# Patient Record
Sex: FEMALE | Race: ASIAN | NOT HISPANIC OR LATINO
[De-identification: names, ages, dates, MRNs, and addresses within clinical notes are randomized per-mention and may not be internally consistent; named-entity substitution may affect disease eponyms.]

---

## 2019-05-22 VITALS
WEIGHT: 130 LBS | DIASTOLIC BLOOD PRESSURE: 69 MMHG | HEIGHT: 64 IN | SYSTOLIC BLOOD PRESSURE: 120 MMHG | BODY MASS INDEX: 22.2 KG/M2 | HEART RATE: 83 BPM

## 2019-06-17 ENCOUNTER — APPOINTMENT (OUTPATIENT)
Dept: ANTEPARTUM | Facility: CLINIC | Age: 29
End: 2019-06-17
Payer: COMMERCIAL

## 2019-06-17 ENCOUNTER — ASOB RESULT (OUTPATIENT)
Age: 29
End: 2019-06-17

## 2019-06-17 PROBLEM — Z00.00 ENCOUNTER FOR PREVENTIVE HEALTH EXAMINATION: Status: ACTIVE | Noted: 2019-06-17

## 2019-06-17 PROCEDURE — 76801 OB US < 14 WKS SINGLE FETUS: CPT

## 2019-06-17 PROCEDURE — 76813 OB US NUCHAL MEAS 1 GEST: CPT

## 2019-06-17 PROCEDURE — 36416 COLLJ CAPILLARY BLOOD SPEC: CPT

## 2019-06-19 VITALS — WEIGHT: 131 LBS | DIASTOLIC BLOOD PRESSURE: 76 MMHG | SYSTOLIC BLOOD PRESSURE: 115 MMHG

## 2019-07-01 ENCOUNTER — APPOINTMENT (OUTPATIENT)
Dept: ANTEPARTUM | Facility: CLINIC | Age: 29
End: 2019-07-01

## 2019-07-01 ENCOUNTER — ASOB RESULT (OUTPATIENT)
Age: 29
End: 2019-07-01

## 2019-07-07 ENCOUNTER — RECORD ABSTRACTING (OUTPATIENT)
Age: 29
End: 2019-07-07

## 2019-07-07 DIAGNOSIS — Z87.09 PERSONAL HISTORY OF OTHER DISEASES OF THE RESPIRATORY SYSTEM: ICD-10-CM

## 2019-07-07 DIAGNOSIS — Z83.42 FAMILY HISTORY OF FAMILIAL HYPERCHOLESTEROLEMIA: ICD-10-CM

## 2019-07-07 DIAGNOSIS — Z82.49 FAMILY HISTORY OF ISCHEMIC HEART DISEASE AND OTHER DISEASES OF THE CIRCULATORY SYSTEM: ICD-10-CM

## 2019-07-15 ENCOUNTER — APPOINTMENT (OUTPATIENT)
Dept: OBGYN | Facility: CLINIC | Age: 29
End: 2019-07-15
Payer: COMMERCIAL

## 2019-07-15 VITALS
HEIGHT: 64 IN | DIASTOLIC BLOOD PRESSURE: 71 MMHG | HEART RATE: 88 BPM | BODY MASS INDEX: 22.2 KG/M2 | TEMPERATURE: 97.7 F | OXYGEN SATURATION: 98 % | WEIGHT: 130 LBS | SYSTOLIC BLOOD PRESSURE: 108 MMHG

## 2019-07-15 PROCEDURE — 0502F SUBSEQUENT PRENATAL CARE: CPT

## 2019-07-23 ENCOUNTER — TRANSCRIPTION ENCOUNTER (OUTPATIENT)
Age: 29
End: 2019-07-23

## 2019-07-25 ENCOUNTER — NON-APPOINTMENT (OUTPATIENT)
Age: 29
End: 2019-07-25

## 2019-07-25 LAB
1ST TRIMESTER DATA: NORMAL
2ND TRIMESTER DATA: NORMAL
AFP PNL SERPL: NORMAL
AFP SERPL-ACNC: NORMAL
AFP SERPL-ACNC: NORMAL
B-HCG FREE SERPL-MCNC: NORMAL
CLINICAL BIOCHEMIST REVIEW: NORMAL
FREE BETA HCG 1ST TRIMESTER: NORMAL
INHIBIN A SERPL-MCNC: NORMAL
NOTES NTD: NORMAL
NT: NORMAL
PAPP-A SERPL-ACNC: NORMAL
U ESTRIOL SERPL-SCNC: NORMAL

## 2019-07-25 PROCEDURE — 0502F SUBSEQUENT PRENATAL CARE: CPT

## 2019-07-26 ENCOUNTER — NON-APPOINTMENT (OUTPATIENT)
Age: 29
End: 2019-07-26

## 2019-08-12 ENCOUNTER — ASOB RESULT (OUTPATIENT)
Age: 29
End: 2019-08-12

## 2019-08-12 ENCOUNTER — APPOINTMENT (OUTPATIENT)
Dept: ANTEPARTUM | Facility: CLINIC | Age: 29
End: 2019-08-12
Payer: COMMERCIAL

## 2019-08-12 ENCOUNTER — NON-APPOINTMENT (OUTPATIENT)
Age: 29
End: 2019-08-12

## 2019-08-12 ENCOUNTER — APPOINTMENT (OUTPATIENT)
Dept: OBGYN | Facility: CLINIC | Age: 29
End: 2019-08-12
Payer: COMMERCIAL

## 2019-08-12 VITALS
SYSTOLIC BLOOD PRESSURE: 111 MMHG | DIASTOLIC BLOOD PRESSURE: 68 MMHG | BODY MASS INDEX: 23.73 KG/M2 | HEART RATE: 88 BPM | HEIGHT: 64 IN | WEIGHT: 139 LBS

## 2019-08-12 PROCEDURE — 99213 OFFICE O/P EST LOW 20 MIN: CPT

## 2019-08-12 PROCEDURE — 76811 OB US DETAILED SNGL FETUS: CPT

## 2019-08-12 PROCEDURE — 76817 TRANSVAGINAL US OBSTETRIC: CPT

## 2019-09-12 ENCOUNTER — NON-APPOINTMENT (OUTPATIENT)
Age: 29
End: 2019-09-12

## 2019-09-12 ENCOUNTER — APPOINTMENT (OUTPATIENT)
Dept: OBGYN | Facility: CLINIC | Age: 29
End: 2019-09-12
Payer: COMMERCIAL

## 2019-09-12 VITALS
DIASTOLIC BLOOD PRESSURE: 72 MMHG | HEIGHT: 64 IN | SYSTOLIC BLOOD PRESSURE: 108 MMHG | BODY MASS INDEX: 25.27 KG/M2 | WEIGHT: 148 LBS

## 2019-09-12 PROCEDURE — 0502F SUBSEQUENT PRENATAL CARE: CPT

## 2019-09-13 LAB
APPEARANCE: CLEAR
BACTERIA: NEGATIVE
BILIRUBIN URINE: NEGATIVE
BLOOD URINE: NEGATIVE
COLOR: NORMAL
GLUCOSE QUALITATIVE U: NEGATIVE
HYALINE CASTS: 0 /LPF
KETONES URINE: NORMAL
LEUKOCYTE ESTERASE URINE: ABNORMAL
MICROSCOPIC-UA: NORMAL
NITRITE URINE: NEGATIVE
PH URINE: 6.5
PROTEIN URINE: NEGATIVE
RED BLOOD CELLS URINE: 2 /HPF
SPECIFIC GRAVITY URINE: 1.01
SQUAMOUS EPITHELIAL CELLS: 2 /HPF
UROBILINOGEN URINE: NORMAL
WHITE BLOOD CELLS URINE: 4 /HPF

## 2019-09-14 LAB — BACTERIA UR CULT: NORMAL

## 2019-09-25 ENCOUNTER — RESULT REVIEW (OUTPATIENT)
Age: 29
End: 2019-09-25

## 2019-09-25 LAB
BASOPHILS # BLD AUTO: 0.04 K/UL
BASOPHILS NFR BLD AUTO: 0.4 %
EOSINOPHIL # BLD AUTO: 0.07 K/UL
EOSINOPHIL NFR BLD AUTO: 0.8 %
GLUCOSE 1H P 50 G GLC PO SERPL-MCNC: 164 MG/DL
HCT VFR BLD CALC: 43.5 %
HGB BLD-MCNC: 13.4 G/DL
IMM GRANULOCYTES NFR BLD AUTO: 1.5 %
LYMPHOCYTES # BLD AUTO: 1.27 K/UL
LYMPHOCYTES NFR BLD AUTO: 13.6 %
MAN DIFF?: NORMAL
MCHC RBC-ENTMCNC: 30.2 PG
MCHC RBC-ENTMCNC: 30.8 GM/DL
MCV RBC AUTO: 98 FL
MONOCYTES # BLD AUTO: 0.48 K/UL
MONOCYTES NFR BLD AUTO: 5.1 %
NEUTROPHILS # BLD AUTO: 7.33 K/UL
NEUTROPHILS NFR BLD AUTO: 78.6 %
PLATELET # BLD AUTO: 289 K/UL
RBC # BLD: 4.44 M/UL
RBC # FLD: 13.8 %
WBC # FLD AUTO: 9.33 K/UL

## 2019-10-09 ENCOUNTER — APPOINTMENT (OUTPATIENT)
Dept: OBGYN | Facility: CLINIC | Age: 29
End: 2019-10-09

## 2019-10-09 VITALS
HEIGHT: 64 IN | BODY MASS INDEX: 25.61 KG/M2 | DIASTOLIC BLOOD PRESSURE: 65 MMHG | SYSTOLIC BLOOD PRESSURE: 100 MMHG | WEIGHT: 150 LBS

## 2019-10-10 LAB
GLUCOSE 1H P 100 G GLC PO SERPL-MCNC: 104 MG/DL
GLUCOSE 2H P CHAL SERPL-MCNC: 103 MG/DL
GLUCOSE 3H P CHAL SERPL-MCNC: 96 MG/DL
GLUCOSE BS SERPL-MCNC: 72 MG/DL

## 2019-10-23 ENCOUNTER — APPOINTMENT (OUTPATIENT)
Dept: OBGYN | Facility: CLINIC | Age: 29
End: 2019-10-23
Payer: COMMERCIAL

## 2019-10-23 ENCOUNTER — NON-APPOINTMENT (OUTPATIENT)
Age: 29
End: 2019-10-23

## 2019-10-23 VITALS
BODY MASS INDEX: 26.29 KG/M2 | SYSTOLIC BLOOD PRESSURE: 103 MMHG | DIASTOLIC BLOOD PRESSURE: 65 MMHG | WEIGHT: 154 LBS | HEIGHT: 64 IN

## 2019-10-23 PROCEDURE — 0502F SUBSEQUENT PRENATAL CARE: CPT

## 2019-11-05 ENCOUNTER — ASOB RESULT (OUTPATIENT)
Age: 29
End: 2019-11-05

## 2019-11-05 ENCOUNTER — APPOINTMENT (OUTPATIENT)
Dept: ANTEPARTUM | Facility: CLINIC | Age: 29
End: 2019-11-05
Payer: COMMERCIAL

## 2019-11-05 PROCEDURE — 76816 OB US FOLLOW-UP PER FETUS: CPT

## 2019-11-06 ENCOUNTER — APPOINTMENT (OUTPATIENT)
Dept: OBGYN | Facility: CLINIC | Age: 29
End: 2019-11-06
Payer: COMMERCIAL

## 2019-11-06 ENCOUNTER — NON-APPOINTMENT (OUTPATIENT)
Age: 29
End: 2019-11-06

## 2019-11-06 VITALS
SYSTOLIC BLOOD PRESSURE: 107 MMHG | HEIGHT: 64 IN | DIASTOLIC BLOOD PRESSURE: 70 MMHG | BODY MASS INDEX: 26.29 KG/M2 | WEIGHT: 154 LBS

## 2019-11-06 PROCEDURE — 0502F SUBSEQUENT PRENATAL CARE: CPT

## 2019-11-20 ENCOUNTER — APPOINTMENT (OUTPATIENT)
Dept: OBGYN | Facility: CLINIC | Age: 29
End: 2019-11-20
Payer: COMMERCIAL

## 2019-11-20 ENCOUNTER — NON-APPOINTMENT (OUTPATIENT)
Age: 29
End: 2019-11-20

## 2019-11-20 VITALS
BODY MASS INDEX: 26.63 KG/M2 | SYSTOLIC BLOOD PRESSURE: 111 MMHG | WEIGHT: 156 LBS | DIASTOLIC BLOOD PRESSURE: 69 MMHG | HEIGHT: 64 IN

## 2019-11-20 PROCEDURE — 0502F SUBSEQUENT PRENATAL CARE: CPT

## 2019-12-04 ENCOUNTER — NON-APPOINTMENT (OUTPATIENT)
Age: 29
End: 2019-12-04

## 2019-12-04 ENCOUNTER — APPOINTMENT (OUTPATIENT)
Dept: OBGYN | Facility: CLINIC | Age: 29
End: 2019-12-04

## 2019-12-04 VITALS
BODY MASS INDEX: 27.31 KG/M2 | HEIGHT: 64 IN | SYSTOLIC BLOOD PRESSURE: 114 MMHG | DIASTOLIC BLOOD PRESSURE: 81 MMHG | WEIGHT: 160 LBS

## 2019-12-05 LAB
C TRACH RRNA SPEC QL NAA+PROBE: NOT DETECTED
N GONORRHOEA RRNA SPEC QL NAA+PROBE: NOT DETECTED
SOURCE AMPLIFICATION: NORMAL

## 2019-12-06 LAB
GP B STREP DNA SPEC QL NAA+PROBE: NORMAL
GP B STREP DNA SPEC QL NAA+PROBE: NOT DETECTED
SOURCE GBS: NORMAL

## 2019-12-16 ENCOUNTER — APPOINTMENT (OUTPATIENT)
Dept: OBGYN | Facility: CLINIC | Age: 29
End: 2019-12-16
Payer: COMMERCIAL

## 2019-12-16 ENCOUNTER — NON-APPOINTMENT (OUTPATIENT)
Age: 29
End: 2019-12-16

## 2019-12-16 VITALS
BODY MASS INDEX: 27.49 KG/M2 | DIASTOLIC BLOOD PRESSURE: 72 MMHG | WEIGHT: 161 LBS | HEIGHT: 64 IN | SYSTOLIC BLOOD PRESSURE: 113 MMHG

## 2019-12-16 PROCEDURE — 0502F SUBSEQUENT PRENATAL CARE: CPT

## 2019-12-26 ENCOUNTER — APPOINTMENT (OUTPATIENT)
Dept: OBGYN | Facility: CLINIC | Age: 29
End: 2019-12-26
Payer: COMMERCIAL

## 2019-12-26 ENCOUNTER — NON-APPOINTMENT (OUTPATIENT)
Age: 29
End: 2019-12-26

## 2019-12-26 VITALS
HEIGHT: 64 IN | SYSTOLIC BLOOD PRESSURE: 112 MMHG | WEIGHT: 162 LBS | DIASTOLIC BLOOD PRESSURE: 73 MMHG | BODY MASS INDEX: 27.66 KG/M2

## 2019-12-26 DIAGNOSIS — Z87.898 PERSONAL HISTORY OF OTHER SPECIFIED CONDITIONS: ICD-10-CM

## 2019-12-26 DIAGNOSIS — Z34.00 ENCOUNTER FOR SUPERVISION OF NORMAL FIRST PREGNANCY, UNSPECIFIED TRIMESTER: ICD-10-CM

## 2019-12-26 DIAGNOSIS — Z32.01 ENCOUNTER FOR PREGNANCY TEST, RESULT POSITIVE: ICD-10-CM

## 2019-12-26 PROCEDURE — 0502F SUBSEQUENT PRENATAL CARE: CPT

## 2019-12-27 ENCOUNTER — INPATIENT (INPATIENT)
Facility: HOSPITAL | Age: 29
LOS: 2 days | Discharge: HOME | End: 2019-12-30
Attending: OBSTETRICS & GYNECOLOGY | Admitting: OBSTETRICS & GYNECOLOGY
Payer: COMMERCIAL

## 2019-12-27 ENCOUNTER — TRANSCRIPTION ENCOUNTER (OUTPATIENT)
Age: 29
End: 2019-12-27

## 2019-12-27 VITALS
SYSTOLIC BLOOD PRESSURE: 136 MMHG | TEMPERATURE: 99 F | DIASTOLIC BLOOD PRESSURE: 78 MMHG | HEART RATE: 94 BPM | RESPIRATION RATE: 18 BRPM

## 2019-12-27 DIAGNOSIS — O26.899 OTHER SPECIFIED PREGNANCY RELATED CONDITIONS, UNSPECIFIED TRIMESTER: ICD-10-CM

## 2019-12-27 DIAGNOSIS — Z3A.00 WEEKS OF GESTATION OF PREGNANCY NOT SPECIFIED: ICD-10-CM

## 2019-12-27 LAB
BASOPHILS # BLD AUTO: 0.06 K/UL — SIGNIFICANT CHANGE UP (ref 0–0.2)
BASOPHILS NFR BLD AUTO: 0.4 % — SIGNIFICANT CHANGE UP (ref 0–2)
BLD GP AB SCN SERPL QL: NEGATIVE — SIGNIFICANT CHANGE UP
EOSINOPHIL # BLD AUTO: 0.06 K/UL — SIGNIFICANT CHANGE UP (ref 0–0.5)
EOSINOPHIL NFR BLD AUTO: 0.4 % — SIGNIFICANT CHANGE UP (ref 0–6)
HCT VFR BLD CALC: 42.3 % — SIGNIFICANT CHANGE UP (ref 34.5–45)
HGB BLD-MCNC: 14.1 G/DL — SIGNIFICANT CHANGE UP (ref 11.5–15.5)
IMM GRANULOCYTES NFR BLD AUTO: 1 % — SIGNIFICANT CHANGE UP (ref 0–1.5)
LYMPHOCYTES # BLD AUTO: 13.5 % — SIGNIFICANT CHANGE UP (ref 13–44)
LYMPHOCYTES # BLD AUTO: 2.1 K/UL — SIGNIFICANT CHANGE UP (ref 1–3.3)
MCHC RBC-ENTMCNC: 29.4 PG — SIGNIFICANT CHANGE UP (ref 27–34)
MCHC RBC-ENTMCNC: 33.3 % — SIGNIFICANT CHANGE UP (ref 32–36)
MCV RBC AUTO: 88.3 FL — SIGNIFICANT CHANGE UP (ref 80–100)
MONOCYTES # BLD AUTO: 1.17 K/UL — HIGH (ref 0–0.9)
MONOCYTES NFR BLD AUTO: 7.5 % — SIGNIFICANT CHANGE UP (ref 2–14)
NEUTROPHILS # BLD AUTO: 12.01 K/UL — HIGH (ref 1.8–7.4)
NEUTROPHILS NFR BLD AUTO: 77.2 % — HIGH (ref 43–77)
NRBC # FLD: 0 K/UL — SIGNIFICANT CHANGE UP (ref 0–0)
PLATELET # BLD AUTO: 303 K/UL — SIGNIFICANT CHANGE UP (ref 150–400)
PMV BLD: 11.4 FL — SIGNIFICANT CHANGE UP (ref 7–13)
RBC # BLD: 4.79 M/UL — SIGNIFICANT CHANGE UP (ref 3.8–5.2)
RBC # FLD: 13.2 % — SIGNIFICANT CHANGE UP (ref 10.3–14.5)
RH IG SCN BLD-IMP: POSITIVE — SIGNIFICANT CHANGE UP
RH IG SCN BLD-IMP: POSITIVE — SIGNIFICANT CHANGE UP
WBC # BLD: 15.56 K/UL — HIGH (ref 3.8–10.5)
WBC # FLD AUTO: 15.56 K/UL — HIGH (ref 3.8–10.5)

## 2019-12-27 PROCEDURE — 99223 1ST HOSP IP/OBS HIGH 75: CPT

## 2019-12-27 RX ORDER — OXYTOCIN 10 UNIT/ML
2 VIAL (ML) INJECTION
Qty: 30 | Refills: 0 | Status: DISCONTINUED | OUTPATIENT
Start: 2019-12-27 | End: 2019-12-28

## 2019-12-27 RX ORDER — OXYTOCIN 10 UNIT/ML
333.33 VIAL (ML) INJECTION
Qty: 20 | Refills: 0 | Status: DISCONTINUED | OUTPATIENT
Start: 2019-12-27 | End: 2019-12-28

## 2019-12-27 RX ORDER — SODIUM CHLORIDE 9 MG/ML
1000 INJECTION, SOLUTION INTRAVENOUS
Refills: 0 | Status: DISCONTINUED | OUTPATIENT
Start: 2019-12-27 | End: 2019-12-28

## 2019-12-27 RX ADMIN — Medication 2 MILLIUNIT(S)/MIN: at 23:02

## 2019-12-27 RX ADMIN — SODIUM CHLORIDE 125 MILLILITER(S): 9 INJECTION, SOLUTION INTRAVENOUS at 22:00

## 2019-12-27 NOTE — OB PROVIDER H&P - ATTENDING COMMENTS
29yrs old  ega [id number] came in labor  vitals stable,afebrile,bp130/72,pulse 82/minute  per abdomen soft, bs present ,uterus 39weeks marciano every 2-3 minutes ,fetal heart rate 140s with category 1 tracing  pelvic exam deferred  assessment 39weeks 3days in active labor  plan expect normal vaginal delivery

## 2019-12-27 NOTE — CHART NOTE - NSCHARTNOTEFT_GEN_A_CORE
R1 OB Progress Note    Patient seen and evaluated at bedside.       T(C): 36.6 (12-27-19 @ 22:29), Max: 37 (12-27-19 @ 20:49)  HR: 82 (12-27-19 @ 23:24) (75 - 99)  BP: 127/69 (12-27-19 @ 22:41) (112/58 - 147/79)  RR: 18 (12-27-19 @ 22:29) (18 - 18)  SpO2: 100% (12-27-19 @ 23:24) (99% - 100%)    SVE: 10/100/0  EFM: 125, mod elen, + accels, 2 late decelerations CATII  [place]:  q3m    A/P 29y P0   admitted for labor  -Labor: Was on pitocin but was discontinued at this time  -Fetus: category 2 tracing due to late deceleration.  Overall reassuring w/ moderate variability and accels.   Continue lateral tilt, O2 as needed for intrauterine resucitation.      Alberto Dobson PGY1  Dr. Mercado at bedside

## 2019-12-27 NOTE — CHART NOTE - NSCHARTNOTEFT_GEN_A_CORE
Pt examined at bedside, urge to push.    SVE: 10/100/0, intact bag    Plan:   - pt requesting epidural, anesthesia at bedside    MARILEE Mata PGY3

## 2019-12-27 NOTE — OB PROVIDER H&P - NSHPPHYSICALEXAM_GEN_ALL_CORE
Bp:136/78 Hr:94 T:98.6  Abdomen: Soft, non-tender on palpation  SVE:8/100/-2  NST: Reactive   [place]: Ctx Q2mins  GBS Negative 12/4/2019  EFW: 3200

## 2019-12-27 NOTE — OB PROVIDER IHI INDUCTION/AUGMENTATION NOTE - NS_CHECKALL_OBGYN_ALL_OB
Order was written/Induction / Augmentation was discussed/H&P was completed/FHR was reviewed/Contractions pattern was reviewed

## 2019-12-27 NOTE — CHART NOTE - NSCHARTNOTEFT_GEN_A_CORE
pelvic exam cervix is fully dilated ,cephalic presentation,0 station arom done. clear amniotic fluid  assessement 39weeks in active labor  plan for expectant management

## 2019-12-27 NOTE — DISCHARGE NOTE OB - PATIENT PORTAL LINK FT
You can access the FollowMyHealth Patient Portal offered by [facility] by registering at the following website: [email or web address]. By joining [facility]’s FollowMyHealth portal, you will also be able to view your health information using other applications (apps) compatible with our system.

## 2019-12-27 NOTE — DISCHARGE NOTE OB - CARE PLAN
Principal Discharge DX:	Vaginal delivery  Goal:	doing good  Assessment and plan of treatment:	f/u [id number] in 6weeks,regular diet ,activity as tolerated

## 2019-12-27 NOTE — DISCHARGE NOTE OB - MEDICATION SUMMARY - MEDICATIONS TO TAKE
I will START or STAY ON the medications listed below when I get home from the hospital:    Actamin 325 mg oral tablet  -- 3 tab(s) by mouth   -- Indication: For Vaginal delivery    Alivio 600 mg oral tablet  -- 1 tab(s) by mouth every 6 hours  -- Indication: For Vaginal delivery    ProAir HFA 90 mcg/inh inhalation aerosol  -- 2 puff(s) inhaled every 6 hours, As needed, Shortness of Breath and/or Wheezing  -- Indication: For Vaginal delivery

## 2019-12-27 NOTE — DISCHARGE NOTE OB - CARE PROVIDER_API CALL
Cecile Mercado)  Obstetrics and Gynecology  [address], [address] [place] [address] [place] [address]  Phone: [phone]  Fax: [phone]  Follow Up Time:

## 2019-12-27 NOTE — OB PROVIDER TRIAGE NOTE - NSOBPROVIDERNOTE_OBGYN_ALL_OB_FT
Evidence of labor. Discussed with Dr. Santos:  -Full admit to labor and delivery  -Routine orders placed  -Epidural for pain management   -Expectant Management

## 2019-12-27 NOTE — OB PROVIDER H&P - ASSESSMENT
Pt of Dr. Mercado,  female  @39.3 weeks gestation presents to triage with c/o regular, painful ctx. Pt complains of vaginal bleeding. Pt reports positive fetal movement.   Current Ap course uncomplicated  Medical H/x: Asthma, Last attack 1 year ago  Surgical H/x: Denies  Ob/Gyn H/x: Ovarian cyst   Psych H/X: Denies  Meds: Pnv  NKDA      Bp:136/78 Hr:94 T:98.6  Abdomen: Soft, non-tender on palpation  SVE:8/100/-2, Bloody show   NST: Reactive   [place]: Ctx Q2mins  GBS Negative 2019  EFW: 3200    Evidence of labor. Discussed with Dr. Santos:  -Full admit to labor and delivery  -Routine orders placed  -Epidural for pain management   -Expectant Management

## 2019-12-27 NOTE — OB PROVIDER TRIAGE NOTE - NSHPPHYSICALEXAM_GEN_ALL_CORE
Bp:136/78 Hr:94 T:98.6  Abdomen: Soft, non-tender on palpation  SVE:8/100/-2  NST: Reactive   [place]: Ctx Q2mins

## 2019-12-27 NOTE — OB PROVIDER TRIAGE NOTE - HISTORY OF PRESENT ILLNESS
Pt of Dr. Mercado  female  @39.3 weeks gestation presents to triage with c/o regular, painful ctx. Pt complains of vaginal bleeding. Pt reports positive fetal movement.   Current Ap course uncomplicated  Medical H/x: Asthma, Last attack 1 year ago  Surgical H/x: Denies  Ob/Gyn H/x: Ovarian cyst   Psych H/X: Denies  Meds: Pnv  NKDA

## 2019-12-27 NOTE — OB PROVIDER DELIVERY SUMMARY - NSPROVIDERDELIVERYNOTE_OBGYN_ALL_OB_FT
29yrs old  ega [id number] has normal vaginal delivery a live child with apgar scores9&9 with 2nd degree perineal laceration.placenta came intact with membranes.wound sutured in layers.complications none.estimated blood loss 29yrs old  ega [id number] has normal vaginal delivery a live child with apgar scores9&9 with RLM cut to facilitate delivery. placenta came intact with membranes.wound sutured in layers.complications none.estimated blood loss 300ml.

## 2019-12-28 PROCEDURE — 59409 OBSTETRICAL CARE: CPT | Mod: U9

## 2019-12-28 RX ORDER — OXYCODONE HYDROCHLORIDE 5 MG/1
5 TABLET ORAL
Refills: 0 | Status: DISCONTINUED | OUTPATIENT
Start: 2019-12-28 | End: 2019-12-30

## 2019-12-28 RX ORDER — TETANUS TOXOID, REDUCED DIPHTHERIA TOXOID AND ACELLULAR PERTUSSIS VACCINE, ADSORBED 5; 2.5; 8; 8; 2.5 [IU]/.5ML; [IU]/.5ML; UG/.5ML; UG/.5ML; UG/.5ML
0.5 SUSPENSION INTRAMUSCULAR ONCE
Refills: 0 | Status: DISCONTINUED | OUTPATIENT
Start: 2019-12-28 | End: 2019-12-30

## 2019-12-28 RX ORDER — SODIUM CHLORIDE 9 MG/ML
3 INJECTION INTRAMUSCULAR; INTRAVENOUS; SUBCUTANEOUS EVERY 8 HOURS
Refills: 0 | Status: DISCONTINUED | OUTPATIENT
Start: 2019-12-28 | End: 2019-12-30

## 2019-12-28 RX ORDER — IBUPROFEN 200 MG
600 TABLET ORAL EVERY 6 HOURS
Refills: 0 | Status: DISCONTINUED | OUTPATIENT
Start: 2019-12-28 | End: 2019-12-30

## 2019-12-28 RX ORDER — AER TRAVELER 0.5 G/1
1 SOLUTION RECTAL; TOPICAL EVERY 4 HOURS
Refills: 0 | Status: DISCONTINUED | OUTPATIENT
Start: 2019-12-28 | End: 2019-12-30

## 2019-12-28 RX ORDER — HYDROCORTISONE 1 %
1 OINTMENT (GRAM) TOPICAL EVERY 6 HOURS
Refills: 0 | Status: DISCONTINUED | OUTPATIENT
Start: 2019-12-28 | End: 2019-12-30

## 2019-12-28 RX ORDER — ALBUTEROL 90 UG/1
2 AEROSOL, METERED ORAL
Qty: 0 | Refills: 0 | DISCHARGE
Start: 2019-12-28

## 2019-12-28 RX ORDER — BENZOCAINE 10 %
1 GEL (GRAM) MUCOUS MEMBRANE EVERY 6 HOURS
Refills: 0 | Status: DISCONTINUED | OUTPATIENT
Start: 2019-12-28 | End: 2019-12-30

## 2019-12-28 RX ORDER — DIPHENHYDRAMINE HCL 50 MG
25 CAPSULE ORAL EVERY 6 HOURS
Refills: 0 | Status: DISCONTINUED | OUTPATIENT
Start: 2019-12-28 | End: 2019-12-30

## 2019-12-28 RX ORDER — KETOROLAC TROMETHAMINE 30 MG/ML
30 SYRINGE (ML) INJECTION ONCE
Refills: 0 | Status: DISCONTINUED | OUTPATIENT
Start: 2019-12-28 | End: 2019-12-28

## 2019-12-28 RX ORDER — ALBUTEROL 90 UG/1
2 AEROSOL, METERED ORAL EVERY 6 HOURS
Refills: 0 | Status: DISCONTINUED | OUTPATIENT
Start: 2019-12-28 | End: 2019-12-30

## 2019-12-28 RX ORDER — OXYTOCIN 10 UNIT/ML
333.33 VIAL (ML) INJECTION
Qty: 20 | Refills: 0 | Status: DISCONTINUED | OUTPATIENT
Start: 2019-12-28 | End: 2019-12-28

## 2019-12-28 RX ORDER — SIMETHICONE 80 MG/1
80 TABLET, CHEWABLE ORAL EVERY 4 HOURS
Refills: 0 | Status: DISCONTINUED | OUTPATIENT
Start: 2019-12-28 | End: 2019-12-30

## 2019-12-28 RX ORDER — PRAMOXINE HYDROCHLORIDE 150 MG/15G
1 AEROSOL, FOAM RECTAL EVERY 4 HOURS
Refills: 0 | Status: DISCONTINUED | OUTPATIENT
Start: 2019-12-28 | End: 2019-12-30

## 2019-12-28 RX ORDER — IBUPROFEN 200 MG
1 TABLET ORAL
Qty: 0 | Refills: 0 | DISCHARGE
Start: 2019-12-28

## 2019-12-28 RX ORDER — ACETAMINOPHEN 500 MG
3 TABLET ORAL
Qty: 0 | Refills: 0 | DISCHARGE
Start: 2019-12-28

## 2019-12-28 RX ORDER — ACETAMINOPHEN 500 MG
975 TABLET ORAL
Refills: 0 | Status: DISCONTINUED | OUTPATIENT
Start: 2019-12-28 | End: 2019-12-30

## 2019-12-28 RX ORDER — IBUPROFEN 200 MG
600 TABLET ORAL EVERY 6 HOURS
Refills: 0 | Status: COMPLETED | OUTPATIENT
Start: 2019-12-28 | End: 2020-11-25

## 2019-12-28 RX ORDER — OXYCODONE HYDROCHLORIDE 5 MG/1
5 TABLET ORAL ONCE
Refills: 0 | Status: DISCONTINUED | OUTPATIENT
Start: 2019-12-28 | End: 2019-12-30

## 2019-12-28 RX ORDER — GLYCERIN ADULT
1 SUPPOSITORY, RECTAL RECTAL AT BEDTIME
Refills: 0 | Status: DISCONTINUED | OUTPATIENT
Start: 2019-12-28 | End: 2019-12-30

## 2019-12-28 RX ORDER — DIBUCAINE 1 %
1 OINTMENT (GRAM) RECTAL EVERY 6 HOURS
Refills: 0 | Status: DISCONTINUED | OUTPATIENT
Start: 2019-12-28 | End: 2019-12-30

## 2019-12-28 RX ORDER — LANOLIN
1 OINTMENT (GRAM) TOPICAL EVERY 6 HOURS
Refills: 0 | Status: DISCONTINUED | OUTPATIENT
Start: 2019-12-28 | End: 2019-12-30

## 2019-12-28 RX ORDER — MAGNESIUM HYDROXIDE 400 MG/1
30 TABLET, CHEWABLE ORAL
Refills: 0 | Status: DISCONTINUED | OUTPATIENT
Start: 2019-12-28 | End: 2019-12-30

## 2019-12-28 RX ADMIN — Medication 975 MILLIGRAM(S): at 21:50

## 2019-12-28 RX ADMIN — Medication 600 MILLIGRAM(S): at 09:43

## 2019-12-28 RX ADMIN — Medication 1 TABLET(S): at 08:43

## 2019-12-28 RX ADMIN — Medication 30 MILLIGRAM(S): at 02:17

## 2019-12-28 RX ADMIN — Medication 600 MILLIGRAM(S): at 17:46

## 2019-12-28 RX ADMIN — SODIUM CHLORIDE 3 MILLILITER(S): 9 INJECTION INTRAMUSCULAR; INTRAVENOUS; SUBCUTANEOUS at 14:40

## 2019-12-28 RX ADMIN — Medication 975 MILLIGRAM(S): at 21:17

## 2019-12-28 RX ADMIN — Medication 30 MILLIGRAM(S): at 01:59

## 2019-12-28 RX ADMIN — Medication 600 MILLIGRAM(S): at 08:43

## 2019-12-28 RX ADMIN — Medication 1000 MILLIUNIT(S)/MIN: at 01:43

## 2019-12-28 RX ADMIN — Medication 600 MILLIGRAM(S): at 16:46

## 2019-12-28 RX ADMIN — Medication 600 MILLIGRAM(S): at 23:27

## 2019-12-28 NOTE — PROGRESS NOTE ADULT - SUBJECTIVE AND OBJECTIVE BOX
subjective:  no complaints    objective:    Vital Signs Last 24 Hrs  T(C): 37 (28 Dec 2019 09:58), Max: 37 (27 Dec 2019 20:49)  T(F): 98.6 (28 Dec 2019 09:58), Max: 98.6 (27 Dec 2019 20:49)  HR: 74 (28 Dec 2019 07:02) (68 - 106)  BP: 110/54 (28 Dec 2019 07:02) (100/57 - 147/79)  BP(mean): --  RR: 16 (28 Dec 2019 09:58) (16 - 18)  SpO2: 98% (28 Dec 2019 09:58) (87% - 100%)    REVIEW OF SYSTEMS:  ALL SYSTEMS WNL    Allergies    No Known Allergies    Intolerances        PHYSICAL EXAM:  Breasts:soft,no masses felt    Respiratory:wnl    Cardiovascular:s1&S2 HEARD,NO MURMURS    Gastrointestinal:BOWEL SOUNDS PRESENT,ABDOMEN SOFT    Genitourinary:UTERUS FIRM,PERINEUM WOUND CLEAN&DRY,MINIMAL VAGINAL BLEEDING    Extremities:NO EDEMA PRESENT    MEDICATIONS  (STANDING):  acetaminophen   Tablet .. 975 milliGRAM(s) Oral <User Schedule>  diphtheria/tetanus/pertussis (acellular) Vaccine (ADAcel) 0.5 milliLiter(s) IntraMuscular once  ibuprofen  Tablet. 600 milliGRAM(s) Oral every 6 hours  prenatal multivitamin 1 Tablet(s) Oral daily  sodium chloride 0.9% lock flush 3 milliLiter(s) IV Push every 8 hours    MEDICATIONS  (PRN):  ALBUTerol    90 MICROgram(s) HFA Inhaler 2 Puff(s) Inhalation every 6 hours PRN Shortness of Breath and/or Wheezing  benzocaine 20%/menthol 0.5% Spray 1 Spray(s) Topical every 6 hours PRN for Perineal discomfort  dibucaine 1% Ointment 1 Application(s) Topical every 6 hours PRN Perineal discomfort  diphenhydrAMINE 25 milliGRAM(s) Oral every 6 hours PRN Pruritus  glycerin Suppository - Adult 1 Suppository(s) Rectal at bedtime PRN Constipation  hydrocortisone 1% Cream 1 Application(s) Topical every 6 hours PRN Moderate Pain (4-6)  lanolin Ointment 1 Application(s) Topical every 6 hours PRN nipple soreness  magnesium hydroxide Suspension 30 milliLiter(s) Oral two times a day PRN Constipation  oxyCODONE    IR 5 milliGRAM(s) Oral every 3 hours PRN Moderate to Severe Pain (4-10)  oxyCODONE    IR 5 milliGRAM(s) Oral once PRN Moderate to Severe Pain (4-10)  pramoxine 1%/zinc 5% Cream 1 Application(s) Topical every 4 hours PRN Moderate Pain (4-6)  simethicone 80 milliGRAM(s) Chew every 4 hours PRN Gas  witch hazel Pads 1 Application(s) Topical every 4 hours PRN Perineal discomfort      LABS:                        14.1   15.56 )-----------( 303      ( 27 Dec 2019 21:50 )             42.3                 RADIOLOGY & ADDITIONAL TESTS:  assessement s/p normal vaginal delivery day#0  plan for d/c home  on 12/30/2019

## 2019-12-28 NOTE — OB RN DELIVERY SUMMARY - NS_SEPSISRSKCALC_OBGYN_ALL_OB_FT
----- Message from Juliet Nix sent at 3/17/2017  4:28 PM EDT -----  Contact: PATIENT  PATIENT IS CALLING STATING DR. MACK SENT PATIENT A LETTER STATING SHE WOULD NEED TO HAVE HER BLOOD REDRAWN. LOOKED IN CHART THERE ARE NO ORDERS FOR BLOOD WORK. PATIENT WANTS TO KNOW DOES SHE NEED TO HAVE THEM REDRAWN AND IF SO CAN SHE GET ORDERS FOR BLOOD WORK. YOU CAN REACH PATIENT BACK [phone]  
I lmom to advise the patient the labs need to be drawn about 8 weeks from now and advised her to call back about one week before so lab order can be created  Advised pt to call back if any questions  
EOS calculated successfully. EOS Risk Factor: 0.5/1000 live births ([facility] national incidence); GA=39w4d; Temp=98.6; ROM=2.417; GBS='Negative'; Antibiotics='No antibiotics or any antibiotics < 2 hrs prior to birth'

## 2019-12-28 NOTE — OB NEONATOLOGY/PEDIATRICIAN DELIVERY SUMMARY - NSPEDSNEONOTESA_OBGYN_ALL_OB_FT
Baby is a 39 and 3 wk female born to a 28 y/o  mother via precip . Maternal history of ovarian cyst.Prenatal history uncomplicated. Maternal blood type O+. PNL negative, non-reactive, and immune. GBS negative on 19. AROM at 22:43 on  clear fluids. Baby born vigorous and crying spontaneously. Warmed, dried, stimulated. Apgars 9/9. EOS 0.06. Mom plans to breastfeed and consents hepB.   :19  TOB:1:08 am  BW:2735  ADOD:19

## 2019-12-29 PROCEDURE — 99232 SBSQ HOSP IP/OBS MODERATE 35: CPT

## 2019-12-29 RX ADMIN — Medication 600 MILLIGRAM(S): at 16:18

## 2019-12-29 RX ADMIN — Medication 975 MILLIGRAM(S): at 03:23

## 2019-12-29 RX ADMIN — Medication 1 TABLET(S): at 12:27

## 2019-12-29 RX ADMIN — Medication 600 MILLIGRAM(S): at 10:27

## 2019-12-29 RX ADMIN — Medication 600 MILLIGRAM(S): at 14:59

## 2019-12-29 RX ADMIN — Medication 975 MILLIGRAM(S): at 17:47

## 2019-12-29 RX ADMIN — Medication 975 MILLIGRAM(S): at 17:17

## 2019-12-29 RX ADMIN — Medication 975 MILLIGRAM(S): at 02:59

## 2019-12-29 RX ADMIN — Medication 1 SPRAY(S): at 23:34

## 2019-12-29 RX ADMIN — Medication 975 MILLIGRAM(S): at 12:15

## 2019-12-29 RX ADMIN — Medication 600 MILLIGRAM(S): at 08:38

## 2019-12-29 RX ADMIN — Medication 1 APPLICATION(S): at 23:34

## 2019-12-29 RX ADMIN — PRAMOXINE HYDROCHLORIDE 1 APPLICATION(S): 150 AEROSOL, FOAM RECTAL at 23:35

## 2019-12-29 RX ADMIN — Medication 600 MILLIGRAM(S): at 23:35

## 2019-12-29 RX ADMIN — Medication 975 MILLIGRAM(S): at 13:39

## 2019-12-29 RX ADMIN — Medication 600 MILLIGRAM(S): at 00:01

## 2019-12-29 NOTE — LACTATION INITIAL EVALUATION - INTERVENTION OUTCOME
verbalizes understanding/demonstrates understanding of teaching/Continued assessment and assistance needed at this time./good return demonstration

## 2019-12-29 NOTE — LACTATION INITIAL EVALUATION - LACTATION INTERVENTIONS
initiate dual electric pump routine/initiate skin to skin/initiate hand expression routine/Instructed and assisted with positioning to facilitate proper latch.  Infant observed sustaining latch, suck and swallow.  Infant initiated phototherapy after breastfeeding.  Breastfeeding strategies discussed with patient with regards to increasing milk production.  Feeding cues and feeding log discussed.  Hand expression taught.  Outpatient resources discussed.  Encouraged to call for assistance, as needed.  Primary RN made aware of consult and plan./stimulate nutritive suck using

## 2019-12-29 NOTE — PROGRESS NOTE ADULT - SUBJECTIVE AND OBJECTIVE BOX
Anesthesia Post-op Note    POD#1 S/P labor epidural     Patient is doing well.  OOBAA. Tolerating clears.  Pain is tolerable.  No residual anesthetic issues or complications noted.

## 2019-12-29 NOTE — PROGRESS NOTE ADULT - PROBLEM SELECTOR PLAN 1
- Pain well controlled, continue current pain regimen  - Increase ambulation, SCDs when not ambulating  - Continue regular diet    Carlota Bella PGY-1

## 2019-12-29 NOTE — PROGRESS NOTE ADULT - SUBJECTIVE AND OBJECTIVE BOX
OB Progress Note: VAVD PPD#1    S: 30yo now PPD#1 s/p VAVD. Patient feels well. Pain is well controlled. She is tolerating a regular diet and passing flatus. She is voiding spontaneously, and ambulating without difficulty. Denies CP/SOB. Denies lightheadedness/dizziness. Denies N/V.    O:  Vitals:  Vital Signs Last 24 Hrs  T(C): 36.7 (28 Dec 2019 17:58), Max: 37 (28 Dec 2019 09:58)  T(F): 98.1 (28 Dec 2019 17:58), Max: 98.6 (28 Dec 2019 09:58)  HR: 73 (28 Dec 2019 17:58) (73 - 74)  BP: 114/69 (28 Dec 2019 17:58) (110/54 - 114/69)  BP(mean): --  RR: 16 (28 Dec 2019 17:58) (16 - 17)  SpO2: 95% (28 Dec 2019 17:58) (95% - 99%)    MEDICATIONS  (STANDING):  acetaminophen   Tablet .. 975 milliGRAM(s) Oral <User Schedule>  diphtheria/tetanus/pertussis (acellular) Vaccine (ADAcel) 0.5 milliLiter(s) IntraMuscular once  ibuprofen  Tablet. 600 milliGRAM(s) Oral every 6 hours  measles/mumps/rubella Vaccine 0.5 milliLiter(s) SubCutaneous once  prenatal multivitamin 1 Tablet(s) Oral daily  sodium chloride 0.9% lock flush 3 milliLiter(s) IV Push every 8 hours      Labs:  Blood type: O Positive  Rubella IgG: RPR:                           14.1   15.56<H> >-----------< 303    ( 12-27 @ 21:50 )             42.3      Physical Exam:  General: NAD  Abdomen: soft, non-tender, non-distended, fundus firm  Vaginal: Lochia wnl  Extremities: No erythema/edema

## 2019-12-29 NOTE — PROGRESS NOTE ADULT - SUBJECTIVE AND OBJECTIVE BOX
subjective:  no complaints    objective:    Vital Signs Last 24 Hrs  T(C): 36.9 (29 Dec 2019 06:00), Max: 37 (28 Dec 2019 09:58)  T(F): 98.4 (29 Dec 2019 06:00), Max: 98.6 (28 Dec 2019 09:58)  HR: 79 (29 Dec 2019 06:00) (73 - 79)  BP: 110/60 (29 Dec 2019 06:00) (110/60 - 114/69)  BP(mean): --  RR: 19 (29 Dec 2019 06:00) (16 - 19)  SpO2: 97% (29 Dec 2019 06:00) (95% - 98%)    REVIEW OF SYSTEMS:  ALL SYSTEMS WNL    Allergies    No Known Allergies    Intolerances        PHYSICAL EXAM:  Breasts:soft,no masses felt    Respiratory:wnl    Cardiovascular:s1&S2 HEARD,NO MURMURS    Gastrointestinal:BOWEL SOUNDS PRESENT,ABDOMEN SOFT    Genitourinary:UTERUS FIRM,PERINEUM WOUND CLEAN&DRY,MINIMAL VAGINAL BLEEDING    Extremities:NO EDEMA PRESENT    MEDICATIONS  (STANDING):  acetaminophen   Tablet .. 975 milliGRAM(s) Oral <User Schedule>  diphtheria/tetanus/pertussis (acellular) Vaccine (ADAcel) 0.5 milliLiter(s) IntraMuscular once  ibuprofen  Tablet. 600 milliGRAM(s) Oral every 6 hours  measles/mumps/rubella Vaccine 0.5 milliLiter(s) SubCutaneous once  prenatal multivitamin 1 Tablet(s) Oral daily  sodium chloride 0.9% lock flush 3 milliLiter(s) IV Push every 8 hours    MEDICATIONS  (PRN):  ALBUTerol    90 MICROgram(s) HFA Inhaler 2 Puff(s) Inhalation every 6 hours PRN Shortness of Breath and/or Wheezing  benzocaine 20%/menthol 0.5% Spray 1 Spray(s) Topical every 6 hours PRN for Perineal discomfort  dibucaine 1% Ointment 1 Application(s) Topical every 6 hours PRN Perineal discomfort  diphenhydrAMINE 25 milliGRAM(s) Oral every 6 hours PRN Pruritus  glycerin Suppository - Adult 1 Suppository(s) Rectal at bedtime PRN Constipation  hydrocortisone 1% Cream 1 Application(s) Topical every 6 hours PRN Moderate Pain (4-6)  lanolin Ointment 1 Application(s) Topical every 6 hours PRN nipple soreness  magnesium hydroxide Suspension 30 milliLiter(s) Oral two times a day PRN Constipation  oxyCODONE    IR 5 milliGRAM(s) Oral every 3 hours PRN Moderate to Severe Pain (4-10)  oxyCODONE    IR 5 milliGRAM(s) Oral once PRN Moderate to Severe Pain (4-10)  pramoxine 1%/zinc 5% Cream 1 Application(s) Topical every 4 hours PRN Moderate Pain (4-6)  simethicone 80 milliGRAM(s) Chew every 4 hours PRN Gas  witch hazel Pads 1 Application(s) Topical every 4 hours PRN Perineal discomfort      LABS:                        14.1   15.56 )-----------( 303      ( 27 Dec 2019 21:50 )             42.3                 RADIOLOGY & ADDITIONAL TESTS:  assessement s/p normal vaginal delivery day#1  plan for d/c home tomorrow

## 2019-12-30 VITALS
SYSTOLIC BLOOD PRESSURE: 133 MMHG | OXYGEN SATURATION: 98 % | TEMPERATURE: 98 F | DIASTOLIC BLOOD PRESSURE: 82 MMHG | HEART RATE: 88 BPM | RESPIRATION RATE: 16 BRPM

## 2019-12-30 PROBLEM — Z34.00 ENCOUNTER FOR PRENATAL CARE OF FIRST PREGNANCY: Status: RESOLVED | Noted: 2019-07-07 | Resolved: 2019-12-30

## 2019-12-30 PROBLEM — Z87.898 HISTORY OF IMPAIRED GLUCOSE TOLERANCE: Status: RESOLVED | Noted: 2019-09-25 | Resolved: 2019-12-30

## 2019-12-30 PROBLEM — Z32.01 POSITIVE PREGNANCY TEST: Status: RESOLVED | Noted: 2019-07-07 | Resolved: 2019-12-30

## 2019-12-30 LAB — T PALLIDUM AB TITR SER: NEGATIVE — SIGNIFICANT CHANGE UP

## 2019-12-30 PROCEDURE — 99238 HOSP IP/OBS DSCHRG MGMT 30/<: CPT

## 2019-12-30 RX ORDER — PRENATAL VIT,CAL 74/IRON/FOLIC 27 MG-1 MG
27-1 TABLET ORAL
Refills: 0 | Status: COMPLETED | COMMUNITY
End: 2019-12-30

## 2019-12-30 RX ORDER — INFLUENZA A VIRUS A/CALIFORNIA/7/2009 X-179A (H1N1) ANTIGEN (FORMALDEHYDE INACTIVATED), INFLUENZA A VIRUS A/SWITZERLAND/9715293/2013 NIB-88 (H3N2) ANTIGEN (FORMALDEHYDE INACTIVATED), INFLUENZA B VIRUS B/PHUKET/3073/2013 ANTIGEN (FORMALDEHYDE INACTIVATED), AND INFLUENZA B VIRUS B/BRISBANE/60/2008 ANTIGEN (FORMALDEHYDE INACTIVATED) 15; 15; 15; 15 UG/.5ML; UG/.5ML; UG/.5ML; UG/.5ML
INJECTION, SUSPENSION INTRAMUSCULAR
Qty: 1 | Refills: 0 | Status: COMPLETED | COMMUNITY
Start: 2019-09-12 | End: 2019-12-30

## 2019-12-30 RX ADMIN — Medication 0.5 MILLILITER(S): at 13:23

## 2019-12-30 RX ADMIN — Medication 600 MILLIGRAM(S): at 00:00

## 2019-12-30 RX ADMIN — Medication 600 MILLIGRAM(S): at 06:12

## 2019-12-30 RX ADMIN — Medication 600 MILLIGRAM(S): at 06:50

## 2019-12-30 RX ADMIN — Medication 600 MILLIGRAM(S): at 14:20

## 2019-12-30 RX ADMIN — Medication 975 MILLIGRAM(S): at 02:15

## 2019-12-30 RX ADMIN — Medication 1 TABLET(S): at 13:31

## 2019-12-30 RX ADMIN — Medication 600 MILLIGRAM(S): at 13:23

## 2019-12-30 RX ADMIN — Medication 975 MILLIGRAM(S): at 03:16

## 2019-12-30 NOTE — PROGRESS NOTE ADULT - SUBJECTIVE AND OBJECTIVE BOX
subjective:  no complaints    objective:    Vital Signs Last 24 Hrs  T(C): 36.9 (30 Dec 2019 06:00), Max: 36.9 (30 Dec 2019 06:00)  T(F): 98.5 (30 Dec 2019 06:00), Max: 98.5 (30 Dec 2019 06:00)  HR: 88 (30 Dec 2019 06:00) (88 - 96)  BP: 133/82 (30 Dec 2019 06:00) (125/69 - 133/82)  BP(mean): --  RR: 16 (30 Dec 2019 06:00) (16 - 16)  SpO2: 98% (30 Dec 2019 06:00) (97% - 98%)    REVIEW OF SYSTEMS:  ALL SYSTEMS WNL    Allergies    No Known Allergies    Intolerances        PHYSICAL EXAM:  Breasts:soft,no masses felt    Respiratory:wnl    Cardiovascular:s1&S2 HEARD,NO MURMURS    Gastrointestinal:BOWEL SOUNDS PRESENT,ABDOMEN SOFT    Genitourinary:UTERUS FIRM,PERINEUM WOUND CLEAN&DRY,MINIMAL VAGINAL BLEEDING    Extremities:NO EDEMA PRESENT    MEDICATIONS  (STANDING):  acetaminophen   Tablet .. 975 milliGRAM(s) Oral <User Schedule>  diphtheria/tetanus/pertussis (acellular) Vaccine (ADAcel) 0.5 milliLiter(s) IntraMuscular once  ibuprofen  Tablet. 600 milliGRAM(s) Oral every 6 hours  measles/mumps/rubella Vaccine 0.5 milliLiter(s) SubCutaneous once  prenatal multivitamin 1 Tablet(s) Oral daily  sodium chloride 0.9% lock flush 3 milliLiter(s) IV Push every 8 hours    MEDICATIONS  (PRN):  ALBUTerol    90 MICROgram(s) HFA Inhaler 2 Puff(s) Inhalation every 6 hours PRN Shortness of Breath and/or Wheezing  benzocaine 20%/menthol 0.5% Spray 1 Spray(s) Topical every 6 hours PRN for Perineal discomfort  dibucaine 1% Ointment 1 Application(s) Topical every 6 hours PRN Perineal discomfort  diphenhydrAMINE 25 milliGRAM(s) Oral every 6 hours PRN Pruritus  glycerin Suppository - Adult 1 Suppository(s) Rectal at bedtime PRN Constipation  hydrocortisone 1% Cream 1 Application(s) Topical every 6 hours PRN Moderate Pain (4-6)  lanolin Ointment 1 Application(s) Topical every 6 hours PRN nipple soreness  magnesium hydroxide Suspension 30 milliLiter(s) Oral two times a day PRN Constipation  oxyCODONE    IR 5 milliGRAM(s) Oral every 3 hours PRN Moderate to Severe Pain (4-10)  oxyCODONE    IR 5 milliGRAM(s) Oral once PRN Moderate to Severe Pain (4-10)  pramoxine 1%/zinc 5% Cream 1 Application(s) Topical every 4 hours PRN Moderate Pain (4-6)  simethicone 80 milliGRAM(s) Chew every 4 hours PRN Gas  witch hazel Pads 1 Application(s) Topical every 4 hours PRN Perineal discomfort      LABS:                RADIOLOGY & ADDITIONAL TESTS:  assessement s/p normal vaginal delivery day#2  plan for d/c home

## 2020-01-02 PROBLEM — N83.209 UNSPECIFIED OVARIAN CYST, UNSPECIFIED SIDE: Chronic | Status: ACTIVE | Noted: 2019-12-27

## 2020-01-02 PROBLEM — J45.909 UNSPECIFIED ASTHMA, UNCOMPLICATED: Chronic | Status: ACTIVE | Noted: 2019-12-27

## 2020-02-11 ENCOUNTER — APPOINTMENT (OUTPATIENT)
Dept: OBGYN | Facility: CLINIC | Age: 30
End: 2020-02-11
Payer: COMMERCIAL

## 2020-02-11 ENCOUNTER — NON-APPOINTMENT (OUTPATIENT)
Age: 30
End: 2020-02-11

## 2020-02-11 VITALS
HEIGHT: 64 IN | BODY MASS INDEX: 25.27 KG/M2 | WEIGHT: 148 LBS | OXYGEN SATURATION: 98 % | DIASTOLIC BLOOD PRESSURE: 70 MMHG | HEART RATE: 83 BPM | TEMPERATURE: 97.7 F | SYSTOLIC BLOOD PRESSURE: 104 MMHG

## 2020-02-11 RX ORDER — IBUPROFEN 600 MG/1
600 TABLET, FILM COATED ORAL
Qty: 1 | Refills: 0 | Status: COMPLETED | COMMUNITY
Start: 2019-12-30 | End: 2020-02-11

## 2020-02-11 RX ORDER — ACETAMINOPHEN 500 MG/1
500 TABLET ORAL
Qty: 60 | Refills: 0 | Status: COMPLETED | COMMUNITY
Start: 2019-12-30 | End: 2020-02-11

## 2020-02-11 NOTE — HISTORY OF PRESENT ILLNESS
[Postpartum Follow Up] : postpartum follow up [Complications:___] : no complications [Last Pap Date: ___] : Last Pap Date: [unfilled] [Delivery Date: ___] : on [unfilled] [] : delivered by vaginal delivery [Wt. ___] : weighing [unfilled] [Female] : Delivery History: baby girl [Rhogam] : Rhogam was not administered [Rubella Vaccine] : Rubella vaccine was not administered [BTL] : no tubal ligation [Pertussis Vaccine] : Pertussis vaccine was not administered [Breastfeeding] : currently nursing [Discharge HCT: ___] : hematocrit level was [unfilled] [Discharge HGB: ___] : hemoglobin level was [unfilled] [Resumed [place]] : has not resumed intercourse [Resumed Menses] : has not resumed her menses [Intended Contraception] : Intended Contraception: [Breast Pain] : no breast pain [Back Pain] : no back pain [Abdominal Pain] : no abdominal pain [BreastFeeding Problems] : no breastfeeding problems [Chest Pain] : no chest pain [Cracked Nipples] : no cracked nipples [S/Sx PP Depression] : no signs/symptoms of postpartum depression [Heavy Bleeding] : no heavy bleeding [Episiotomy Site Pain] : no episiotomy site pain [None] : No associated symptoms are reported [de-identified] : progesterone pills

## 2020-02-12 LAB
APPEARANCE: ABNORMAL
BACTERIA UR CULT: NORMAL
BACTERIA: NEGATIVE
BILIRUBIN URINE: NEGATIVE
BLOOD URINE: ABNORMAL
COLOR: YELLOW
GLUCOSE QUALITATIVE U: NEGATIVE
HYALINE CASTS: 2 /LPF
KETONES URINE: NEGATIVE
LEUKOCYTE ESTERASE URINE: NEGATIVE
MICROSCOPIC-UA: NORMAL
NITRITE URINE: NEGATIVE
PH URINE: 5.5
PROTEIN URINE: NORMAL
RED BLOOD CELLS URINE: 22 /HPF
SPECIFIC GRAVITY URINE: 1.02
SQUAMOUS EPITHELIAL CELLS: 1 /HPF
UROBILINOGEN URINE: NORMAL
WHITE BLOOD CELLS URINE: 3 /HPF

## 2020-02-13 LAB
CANDIDA VAG CYTO: NOT DETECTED
G VAGINALIS+PREV SP MTYP VAG QL MICRO: DETECTED
T VAGINALIS VAG QL WET PREP: NOT DETECTED

## 2020-02-14 DIAGNOSIS — N76.0 ACUTE VAGINITIS: ICD-10-CM

## 2020-02-14 DIAGNOSIS — B96.89 ACUTE VAGINITIS: ICD-10-CM

## 2020-07-22 ENCOUNTER — TRANSCRIPTION ENCOUNTER (OUTPATIENT)
Age: 30
End: 2020-07-22

## 2020-07-27 ENCOUNTER — TRANSCRIPTION ENCOUNTER (OUTPATIENT)
Age: 30
End: 2020-07-27

## 2020-08-17 ENCOUNTER — TRANSCRIPTION ENCOUNTER (OUTPATIENT)
Age: 30
End: 2020-08-17

## 2020-08-17 RX ORDER — NORETHINDRONE 0.35 MG/1
0.35 TABLET ORAL DAILY
Qty: 1 | Refills: 3 | Status: COMPLETED | COMMUNITY
Start: 2020-02-11 | End: 2020-08-17

## 2020-08-17 RX ORDER — METRONIDAZOLE 7.5 MG/G
0.75 GEL VAGINAL
Qty: 1 | Refills: 1 | Status: COMPLETED | COMMUNITY
Start: 2020-02-14 | End: 2020-08-17

## 2020-08-17 RX ORDER — PNV 119/IRON FUM/FOLIC ACID 29 MG-1 MG
TABLET ORAL DAILY
Refills: 0 | Status: COMPLETED | COMMUNITY
End: 2020-08-17

## 2020-12-23 PROBLEM — N76.0 BACTERIAL VAGINOSIS: Status: RESOLVED | Noted: 2020-02-14 | Resolved: 2020-12-23

## 2021-01-15 ENCOUNTER — RESULT REVIEW (OUTPATIENT)
Age: 31
End: 2021-01-15

## 2021-01-15 ENCOUNTER — TRANSCRIPTION ENCOUNTER (OUTPATIENT)
Age: 31
End: 2021-01-15

## 2021-01-15 ENCOUNTER — INPATIENT (INPATIENT)
Facility: HOSPITAL | Age: 31
LOS: 0 days | Discharge: HOME | End: 2021-01-16
Attending: OBSTETRICS & GYNECOLOGY | Admitting: OBSTETRICS & GYNECOLOGY
Payer: COMMERCIAL

## 2021-01-15 VITALS
DIASTOLIC BLOOD PRESSURE: 84 MMHG | HEART RATE: 100 BPM | TEMPERATURE: 99 F | RESPIRATION RATE: 18 BRPM | OXYGEN SATURATION: 100 % | HEIGHT: 63 IN | SYSTOLIC BLOOD PRESSURE: 139 MMHG

## 2021-01-15 DIAGNOSIS — O00.90 UNSPECIFIED ECTOPIC PREGNANCY WITHOUT INTRAUTERINE PREGNANCY: ICD-10-CM

## 2021-01-15 LAB
ALBUMIN SERPL ELPH-MCNC: 4.6 G/DL — SIGNIFICANT CHANGE UP (ref 3.3–5)
ALP SERPL-CCNC: 91 U/L — SIGNIFICANT CHANGE UP (ref 40–120)
ALT FLD-CCNC: 23 U/L — SIGNIFICANT CHANGE UP (ref 4–33)
ANION GAP SERPL CALC-SCNC: 13 MMOL/L — SIGNIFICANT CHANGE UP (ref 7–14)
APPEARANCE UR: ABNORMAL
APTT BLD: 34.5 SEC — SIGNIFICANT CHANGE UP (ref 27–36.3)
AST SERPL-CCNC: 16 U/L — SIGNIFICANT CHANGE UP (ref 4–32)
BASOPHILS # BLD AUTO: 0.07 K/UL — SIGNIFICANT CHANGE UP (ref 0–0.2)
BASOPHILS NFR BLD AUTO: 0.7 % — SIGNIFICANT CHANGE UP (ref 0–2)
BILIRUB SERPL-MCNC: 0.2 MG/DL — SIGNIFICANT CHANGE UP (ref 0.2–1.2)
BILIRUB UR-MCNC: NEGATIVE — SIGNIFICANT CHANGE UP
BLD GP AB SCN SERPL QL: NEGATIVE — SIGNIFICANT CHANGE UP
BLOOD GAS VENOUS COMPREHENSIVE RESULT: SIGNIFICANT CHANGE UP
BUN SERPL-MCNC: 12 MG/DL — SIGNIFICANT CHANGE UP (ref 7–23)
CALCIUM SERPL-MCNC: 9.6 MG/DL — SIGNIFICANT CHANGE UP (ref 8.4–10.5)
CHLORIDE SERPL-SCNC: 102 MMOL/L — SIGNIFICANT CHANGE UP (ref 98–107)
CO2 SERPL-SCNC: 25 MMOL/L — SIGNIFICANT CHANGE UP (ref 22–31)
COLOR SPEC: ABNORMAL
CREAT SERPL-MCNC: 0.77 MG/DL — SIGNIFICANT CHANGE UP (ref 0.5–1.3)
DIFF PNL FLD: ABNORMAL
EOSINOPHIL # BLD AUTO: 0.09 K/UL — SIGNIFICANT CHANGE UP (ref 0–0.5)
EOSINOPHIL NFR BLD AUTO: 0.9 % — SIGNIFICANT CHANGE UP (ref 0–6)
GLUCOSE SERPL-MCNC: 106 MG/DL — HIGH (ref 70–99)
GLUCOSE UR QL: NEGATIVE — SIGNIFICANT CHANGE UP
HCG SERPL-ACNC: 22.1 MIU/ML — SIGNIFICANT CHANGE UP
HCT VFR BLD CALC: 44.2 % — SIGNIFICANT CHANGE UP (ref 34.5–45)
HGB BLD-MCNC: 14.3 G/DL — SIGNIFICANT CHANGE UP (ref 11.5–15.5)
IANC: 7.25 K/UL — SIGNIFICANT CHANGE UP (ref 1.5–8.5)
IMM GRANULOCYTES NFR BLD AUTO: 0.9 % — SIGNIFICANT CHANGE UP (ref 0–1.5)
INR BLD: 1.05 RATIO — SIGNIFICANT CHANGE UP (ref 0.88–1.16)
KETONES UR-MCNC: NEGATIVE — SIGNIFICANT CHANGE UP
LEUKOCYTE ESTERASE UR-ACNC: ABNORMAL
LYMPHOCYTES # BLD AUTO: 2.23 K/UL — SIGNIFICANT CHANGE UP (ref 1–3.3)
LYMPHOCYTES # BLD AUTO: 21.5 % — SIGNIFICANT CHANGE UP (ref 13–44)
MCHC RBC-ENTMCNC: 28.8 PG — SIGNIFICANT CHANGE UP (ref 27–34)
MCHC RBC-ENTMCNC: 32.4 GM/DL — SIGNIFICANT CHANGE UP (ref 32–36)
MCV RBC AUTO: 88.9 FL — SIGNIFICANT CHANGE UP (ref 80–100)
MONOCYTES # BLD AUTO: 0.64 K/UL — SIGNIFICANT CHANGE UP (ref 0–0.9)
MONOCYTES NFR BLD AUTO: 6.2 % — SIGNIFICANT CHANGE UP (ref 2–14)
NEUTROPHILS # BLD AUTO: 7.25 K/UL — SIGNIFICANT CHANGE UP (ref 1.8–7.4)
NEUTROPHILS NFR BLD AUTO: 69.8 % — SIGNIFICANT CHANGE UP (ref 43–77)
NITRITE UR-MCNC: NEGATIVE — SIGNIFICANT CHANGE UP
NRBC # BLD: 0 /100 WBCS — SIGNIFICANT CHANGE UP
NRBC # FLD: 0 K/UL — SIGNIFICANT CHANGE UP
PH UR: 6 — SIGNIFICANT CHANGE UP (ref 5–8)
PLATELET # BLD AUTO: 439 K/UL — HIGH (ref 150–400)
POTASSIUM SERPL-MCNC: 4 MMOL/L — SIGNIFICANT CHANGE UP (ref 3.5–5.3)
POTASSIUM SERPL-SCNC: 4 MMOL/L — SIGNIFICANT CHANGE UP (ref 3.5–5.3)
PROT SERPL-MCNC: 7.7 G/DL — SIGNIFICANT CHANGE UP (ref 6–8.3)
PROT UR-MCNC: ABNORMAL
PROTHROM AB SERPL-ACNC: 11.9 SEC — SIGNIFICANT CHANGE UP (ref 10.6–13.6)
RBC # BLD: 4.97 M/UL — SIGNIFICANT CHANGE UP (ref 3.8–5.2)
RBC # FLD: 12.6 % — SIGNIFICANT CHANGE UP (ref 10.3–14.5)
RH IG SCN BLD-IMP: POSITIVE — SIGNIFICANT CHANGE UP
SODIUM SERPL-SCNC: 140 MMOL/L — SIGNIFICANT CHANGE UP (ref 135–145)
SP GR SPEC: 1.03 — HIGH (ref 1.01–1.02)
UROBILINOGEN FLD QL: SIGNIFICANT CHANGE UP
WBC # BLD: 10.37 K/UL — SIGNIFICANT CHANGE UP (ref 3.8–10.5)
WBC # FLD AUTO: 10.37 K/UL — SIGNIFICANT CHANGE UP (ref 3.8–10.5)

## 2021-01-15 PROCEDURE — 99285 EMERGENCY DEPT VISIT HI MDM: CPT

## 2021-01-15 PROCEDURE — 58662 LAPAROSCOPY EXCISE LESIONS: CPT | Mod: GC

## 2021-01-15 PROCEDURE — 58120 DILATION AND CURETTAGE: CPT | Mod: GC

## 2021-01-15 PROCEDURE — 76817 TRANSVAGINAL US OBSTETRIC: CPT | Mod: 26

## 2021-01-15 RX ORDER — SODIUM CHLORIDE 9 MG/ML
1000 INJECTION, SOLUTION INTRAVENOUS
Refills: 0 | Status: DISCONTINUED | OUTPATIENT
Start: 2021-01-15 | End: 2021-01-16

## 2021-01-15 RX ORDER — SODIUM CHLORIDE 9 MG/ML
1000 INJECTION, SOLUTION INTRAVENOUS ONCE
Refills: 0 | Status: COMPLETED | OUTPATIENT
Start: 2021-01-15 | End: 2021-01-15

## 2021-01-15 RX ADMIN — SODIUM CHLORIDE 1000 MILLILITER(S): 9 INJECTION, SOLUTION INTRAVENOUS at 17:12

## 2021-01-15 RX ADMIN — SODIUM CHLORIDE 1000 MILLILITER(S): 9 INJECTION, SOLUTION INTRAVENOUS at 20:57

## 2021-01-15 RX ADMIN — SODIUM CHLORIDE 125 MILLILITER(S): 9 INJECTION, SOLUTION INTRAVENOUS at 20:57

## 2021-01-15 NOTE — ED PROVIDER NOTE - NS ED ROS FT
GENERAL: No fever, chills  EYES: no vision changes, no discharge.   ENT: no difficulty swallowing or speaking   CARDIAC: no chest pain/pressure, SOB, lower extremity swelling  PULMONARY: no cough, SOB  GI: +abdominal pain, no n/v/d. +vag bleeding  : no dysuria  SKIN: no rashes  NEURO: no headache, lightheadedness, paresthesia  MSK: No joint pain, myalgia, weakness.

## 2021-01-15 NOTE — H&P ADULT - ASSESSMENT
30y  LMP 10/2020 (possible spotting during November unsure, irregular menses since giving birth) presents from Dr. Lares's office for r/o ectopic pregnancy. Patient hemodynamically stable, H/H stable, however given minimal hemorraghic fluid on sono in setting of extraovarian mass, patient to OR.

## 2021-01-15 NOTE — ED ADULT NURSE NOTE - OBJECTIVE STATEMENT
31 y/o female presents to ED complaining of pain to left lower side of abdomen along with vaginal bleeding. Pt a&ox3, ambulatory, was sent here from OB DR Lares for r/o ectopic pregnancy. Pt took 4 pregnancy tests at home and 2 at OB and all were positive. Pt endorses having ultrasound and vaginal ultrasound at OB and uterine pregnancy not seen. Unknown LMP due to irregular periods r/t childbirth one year ago. Pt endorses vaginal bleeding with clots, states the bleeding is like "a heavy flow period." No distress noted at this time. Hx asthma. Pt denies chest pain, sob, dizziness, n/v/d. MD at bedside for eval. Orders to follow. Will monitor.

## 2021-01-15 NOTE — ED PROVIDER NOTE - ATTENDING CONTRIBUTION TO CARE
Dr. Hyatt: 29 yo  with no sig PMH sent to ED with concern for ectopic.  Pt has irregular menstrual periods, LMP sometime in 2020.  She had positive pregnancy test at home and then saw OBGYN today who found no IUP and likely ectopic on ultrasound.  Pt feels cramping similar to menstrual cramps, otherwise no abdominal pain, N/V/D or fever.  On exam pt well appearing, in NAD, heart RRR, lungs CTAB, abd NTND, extremities without swelling, strength 5/5 in all extremities and skin without rash.

## 2021-01-15 NOTE — H&P ADULT - HISTORY OF PRESENT ILLNESS
R2 H&P   30y  LMP 10/2020 (possible spotting during November unsure, irregular menses since giving birth) presents from Dr. Lares's office for r/o ectopic pregnancy. Patient states she had +UPT  (faint line, wasn't 100%), and was ultrasounded in Dr. Lares office this morning. She was found to have a mass that was concerning and sent to ED. Patient having spotting over last few days, <10% of pad filled. Denies passage of tissue. Denies SOB, CP, lightheadedness, dizziness, palpitations. Denies abdominal pain or cramping.       OB/GYN HISTORY:   G1:  x 1 (Dr. Mercado)  G2: present    Last Menstrual Period 10/2020    Name of GYN Physician: Dr. Lares       PAST MEDICAL & SURGICAL HISTORY:  Ovarian cyst    Mild asthma  last attack 1 yr ago    No significant past surgical history        REVIEW OF SYSTEMS  General: denies fevers, chills, tiredness  Skin/Breast: denies breast pain  Respiratory and Thorax: denies shortness of breath, denies cough  Cardiovascular: denies chest pain and denies palpitations  Gastrointestinal: denies abdominal pain, nausea/ vomiting	  Genitourinary: denies dysuria, increased urinary frequency, urgency	  Constitutional, Cardiovascular, Respiratory, Gastrointestinal, Genitourinary, Musculoskeletal and Integumentary review of systems are normal except as noted. 	    Meds: denies      Allergies    No Known Allergies    Intolerances        SOCIAL HISTORY: denies t/e/d

## 2021-01-15 NOTE — ED ADULT TRIAGE NOTE - CHIEF COMPLAINT QUOTE
Arrives from home sent by Dr. Lares for right sided ectopic pregnancy. Pt is experiencing cramping and vaginal bleeding. Unknown LMP due to irregular periods r/t childbirth one year ago

## 2021-01-15 NOTE — ED PROVIDER NOTE - CLINICAL SUMMARY MEDICAL DECISION MAKING FREE TEXT BOX
Pt is a 31 yo F who presents with abd pain sent from Ob/gyn to r/o ectopic. Will check cbc, cmp, coags, type and screen, b hcg, transvaginal US. Will consult OB

## 2021-01-15 NOTE — ED PROVIDER NOTE - OBJECTIVE STATEMENT
Pt is a 31 yo F who presents with abd pain sent from Ob/gyn to r/o ectopic. Pt's LMP was in November. She is in Pt is a 29 yo F who presents with abd pain sent from Ob/gyn to r/o ectopic. Pt's LMP was in November. She is on oral contraceptive. On Jan 11 she took preg test, was positive, stopped oral contraceptive, had vaginal bleeding. Saw ob gyn doc who did a US in the office that identified a possible ectopic so pt sent to ed. Mild abd RLQ pain, no N/V, no fevers, no lightheaded

## 2021-01-15 NOTE — H&P ADULT - PROBLEM SELECTOR PLAN 1
Plan OR:   Neuro: IV pain medication prn  CV: Patient hemodynamically stable- will continue to monitor vitals closely.   Pulm: saturating well on room air   GI: NPO for OR   : Zarate to be placed intra-operatively.   Reproductive: -Ruptured ectopic pregnancy  vs.  Ectopic pregnancy (not candidate for medical therapy):  patient to go to OR for diagnostic laparoscopy, unilateral salpingectomy, possible unilateral oopherectomy, possible exploratory laparotomy.  Patient counseled on risks of surgery including bleeding, infection and damage to surrounding organs.  All questions/concerns of patient addressed. All consents signed with patient.    Heme: SCD's in OR for DVT ppx.  Aggressive and early ambulation post-operatively for DVT ppx.   ID: afebrile   FEN: LR@125.  Replete electolytes prn   Dispo: To OR for procedure as detailed above    ADomney PGY-2  d/w Dr. Peck and Dr. Whitaker, GYN SERVICE  patient to be seen by Dr. Whitaker

## 2021-01-15 NOTE — H&P ADULT - ATTENDING COMMENTS
Agree with above, pt with sono concerning for ectopic. Per patient, pregnancy is undesired. Explained risks benefits and alternatives of diagnostic laparoscopy, possible salpingectomy if ectopic found, possible ovarian cystectomy, possible D&C if laparoscopy is normal. All questions answered, pt desires D&C for definitive diagnosis if no ectopic found, understands that with clinical presentation chances of normal pregnancy are low but on rare chance pregnancy is normal D&C would result in termination.    Julius Whitaker MD

## 2021-01-15 NOTE — ED PROVIDER NOTE - PHYSICAL EXAMINATION
Mendy Wood MD  GENERAL: Patient awake alert NAD.  HEENT: NC/AT, Moist mucous membranes, PERRL, EOMI.  LUNGS: CTAB, no wheezes or crackles.   CARDIAC: RRR, no m/r/g.    ABDOMEN: Soft, mild right lower quadrant abd pain, ND, No rebound, guarding. No CVA tenderness.   Pelvic deferred. per pt she is not briskly bleeding at this time  EXT: No edema. No calf tenderness. CV 2+DP/PT bilaterally.   MSK: No spinal tenderness, no pain with movement, no deformities.  NEURO: A&Ox3. Moving all extremities.  SKIN: Warm and dry. No rash.  PSYCH: Normal affect.

## 2021-01-16 VITALS — DIASTOLIC BLOOD PRESSURE: 62 MMHG | SYSTOLIC BLOOD PRESSURE: 109 MMHG

## 2021-01-16 LAB
B PERT DNA SPEC QL NAA+PROBE: SIGNIFICANT CHANGE UP
C PNEUM DNA SPEC QL NAA+PROBE: SIGNIFICANT CHANGE UP
CULTURE RESULTS: SIGNIFICANT CHANGE UP
FLUAV H1 2009 PAND RNA SPEC QL NAA+PROBE: SIGNIFICANT CHANGE UP
FLUAV H1 RNA SPEC QL NAA+PROBE: SIGNIFICANT CHANGE UP
FLUAV H3 RNA SPEC QL NAA+PROBE: SIGNIFICANT CHANGE UP
FLUAV SUBTYP SPEC NAA+PROBE: SIGNIFICANT CHANGE UP
FLUBV RNA SPEC QL NAA+PROBE: SIGNIFICANT CHANGE UP
HADV DNA SPEC QL NAA+PROBE: SIGNIFICANT CHANGE UP
HCOV PNL SPEC NAA+PROBE: SIGNIFICANT CHANGE UP
HMPV RNA SPEC QL NAA+PROBE: SIGNIFICANT CHANGE UP
HPIV1 RNA SPEC QL NAA+PROBE: SIGNIFICANT CHANGE UP
HPIV2 RNA SPEC QL NAA+PROBE: SIGNIFICANT CHANGE UP
HPIV3 RNA SPEC QL NAA+PROBE: SIGNIFICANT CHANGE UP
HPIV4 RNA SPEC QL NAA+PROBE: SIGNIFICANT CHANGE UP
RAPID RVP RESULT: SIGNIFICANT CHANGE UP
RV+EV RNA SPEC QL NAA+PROBE: SIGNIFICANT CHANGE UP
SARS-COV-2 RNA SPEC QL NAA+PROBE: SIGNIFICANT CHANGE UP
SPECIMEN SOURCE: SIGNIFICANT CHANGE UP

## 2021-01-16 PROCEDURE — 88305 TISSUE EXAM BY PATHOLOGIST: CPT | Mod: 26

## 2021-01-16 RX ORDER — FENTANYL CITRATE 50 UG/ML
25 INJECTION INTRAVENOUS
Refills: 0 | Status: DISCONTINUED | OUTPATIENT
Start: 2021-01-16 | End: 2021-01-16

## 2021-01-16 RX ORDER — FENTANYL CITRATE 50 UG/ML
50 INJECTION INTRAVENOUS ONCE
Refills: 0 | Status: DISCONTINUED | OUTPATIENT
Start: 2021-01-16 | End: 2021-01-16

## 2021-01-16 RX ORDER — OXYCODONE HYDROCHLORIDE 5 MG/1
1 TABLET ORAL
Qty: 5 | Refills: 0
Start: 2021-01-16

## 2021-01-16 RX ORDER — OXYCODONE AND ACETAMINOPHEN 5; 325 MG/1; MG/1
1 TABLET ORAL ONCE
Refills: 0 | Status: DISCONTINUED | OUTPATIENT
Start: 2021-01-16 | End: 2021-01-16

## 2021-01-16 RX ADMIN — FENTANYL CITRATE 25 MICROGRAM(S): 50 INJECTION INTRAVENOUS at 06:53

## 2021-01-16 NOTE — ASU DISCHARGE PLAN (ADULT/PEDIATRIC) - FOLLOW UP APPOINTMENTS
911 or go to the nearest Emergency Room may also call Recovery Room (PACU) 24/7 @ [phone] or go to the nearest Emergency Room

## 2021-01-16 NOTE — BRIEF OPERATIVE NOTE - OPERATION/FINDINGS
Normal uterus, normal left fallopian tube and ovary, right fallopian tube and ovary themselves normal on visualization and palpation, though immediately adjacent to ovary and fimbriated tube was a 2 cm ball of blood products that upon gross examination after removal was unclear if it was blood products from a ruptured ovarian cyst or from an aborted right ectopic pregnancy. Mild hemoperitoneum. Mildly right pelvic side wall inflammation secondary to blood products mass and mild hemoperitoneum. scant tissue on D&C. Normal appendix.

## 2021-01-16 NOTE — ASU DISCHARGE PLAN (ADULT/PEDIATRIC) - ASU DC SPECIAL INSTRUCTIONSFT
Please call to schedule an appointment in 2 weeks with the [facility]. Please call to schedule an appointment in 2 weeks with the [facility] for a postoperative appointment.     Please also follow up on 1/19/2021 for a repeat beta-HCG hormone test. This can be completed at an outpatient lab, in the Emergency Department, or in the [facility].

## 2021-01-16 NOTE — BRIEF OPERATIVE NOTE - NSICDXBRIEFPROCEDURE_GEN_ALL_CORE_FT
PROCEDURES:  Laparoscopy, diagnostic, with dilation and curettage of uterus 16-Jan-2021 09:32:55  Julius Whitaker

## 2021-01-16 NOTE — ASU DISCHARGE PLAN (ADULT/PEDIATRIC) - CARE PROVIDER_API CALL
[facility],   [facility]  [facility]  research/oncology building basement level  Phone: [phone]  Fax: (   )    -  Follow Up Time: 2 weeks

## 2021-01-16 NOTE — ASU DISCHARGE PLAN (ADULT/PEDIATRIC) - NURSING INSTRUCTIONS
You received IV Tylenol for pain management at 04:45AM. Please DO NOT take any Tylenol (Acetaminophen) containing products, such as Vicodin, Percocet, Excedrin, and cold medications for the next 6 hours (until 10:45 AM). DO NOT TAKE MORE THAN 3000 MG OF TYLENOL in a 24 hour period.     You received IV Toradol for pain management at 04:45 AM. Please DO NOT take Motrin/Ibuprofen/Advil/Aleve/NSAIDs (Non-Steroidal Anti-Inflammatory Drugs) for the next 6 hours (until 10:45 AM). You were given intravenous TYLENOL for pain management at ____4:45am___________. Please DO NOT take any products containing TYLENOL or ACETAMINOPHEN, such as VICODIN, PERCOCET, EXCEDRIN, and any over-the-counter cold medication for the next 6 hours (until __10:45am____________). DO NOT TAKE MORE THAN 3000 MG OF TYLENOL in a 24 hour period.     You received IV Toradol for pain management at 04:45 AM. Please DO NOT take Motrin/Ibuprofen/Advil/Aleve/NSAIDs (Non-Steroidal Anti-Inflammatory Drugs) for the next 6 hours (until 10:45 AM).

## 2021-01-16 NOTE — ASU DISCHARGE PLAN (ADULT/PEDIATRIC) - ACTIVITY LEVEL
No excercise/No heavy lifting/No sports/gym/Nothing per rectum/Nothing per vagina/No tub baths/No douching/No tampons/No intercourse x 2 weeks/No excercise/No heavy lifting/No sports/gym/Nothing per rectum/Nothing per vagina/No tub baths/No douching/No tampons/No intercourse

## 2021-01-16 NOTE — ASU DISCHARGE PLAN (ADULT/PEDIATRIC) - PROVIDER TOKENS
FREE:[LAST:[[facility]PHONE:[phone]],FAX:[(   )    -],ADDRESS:[[facility]  research/oncology building basement level],FOLLOWUP:[2 weeks]]

## 2021-01-20 NOTE — CHART NOTE - NSCHARTNOTEFT_GEN_A_CORE
LATE NOTE 2/2 CLINICAL DUTIES. PT EVALUATED AT 9:30 AM    Patient seen and examined at bedside, recently post-op. No acute complaints at this time. Tolerating regular diet, ambulating, voiding. Denies CP, SOB, N/V, fevers, and chills.    Vital Signs Last 24 Hours  T(C): 37.1 (01-16-21 @ 06:00), Max: 37.2 (01-16-21 @ 05:30)  HR: 75 (01-16-21 @ 09:00) (68 - 100)  BP: 106/64 (01-16-21 @ 09:00) (99/60 - 139/84)  RR: 20 (01-16-21 @ 09:00) (14 - 20)  SpO2: 91% (01-16-21 @ 09:00) (80% - 100%)    I&O's Summary    15 Dennis 2021 07:01  -  16 Jan 2021 07:00  --------------------------------------------------------  IN: 125 mL / OUT: 0 mL / NET: 125 mL    16 Jan 2021 07:01  -  16 Jan 2021 11:39  --------------------------------------------------------  IN: 250 mL / OUT: 650 mL / NET: -400 mL        Physical Exam:  General: NAD  CV: NR, RR, S1, S2, no M/R/G  Lungs: CTA-B  Abdomen: Soft, nontender, non-distended, +BS  Incision: port site incisions c/d/i  Ext: No pain or swelling    Labs:             14.3   10.37 )-----------( 439<H>    ( 01-15 @ 14:30 )             44.2         MEDICATIONS  (STANDING):  lactated ringers. 1000 milliLiter(s) (125 mL/Hr) IV Continuous <Continuous>    MEDICATIONS  (PRN):  fentaNYL    Injectable 25 MICROGram(s) IV Push every 5 minutes PRN Moderate Pain (4 - 6)  fentaNYL    Injectable 50 MICROGram(s) IV Push once PRN Severe Pain (7 - 10)  oxycodone    5 mG/acetaminophen 325 mG 1 Tablet(s) Oral once PRN Moderate Pain (4 - 6)      31yo s/p Diagnostic Laparoscopy and D&C recovering well in acute post-operative state.    - cleared for discharge home  - f/u repeat HCG on 1/18 w/ Dr. Arnaud Vargas, PGY-2
Patient contacted at [phone].     Patient is POD#2 s/p dx lsc with removal of right pelvic mass, D&C for possible ectopic pregnancy on 1/16. Patient stated that she has an appointment scheduled with her OBGYN, Dr. Lares, tomorrow 1/19 for repeat blood work.     Patient reports that she feels well. Denies lightheadedness or dizziness. Denies heavy vaginal bleeding. Pain postoperatively well controlled with medication.     d/w Dr. Mendez PGY4   Claritza, PGY-2
R1 Gyn Telephone Note     Called Dr. Lares's office to confirm patient went to appointment 1/19. Got confirmation that she was seen and bHCG was drawn. Patient did not respond to provided phone number. Due to confirmation of follow up, will remove from beta list.     Jesus Byrnes PGY-1

## 2021-01-27 LAB — SURGICAL PATHOLOGY STUDY: SIGNIFICANT CHANGE UP

## 2021-02-03 ENCOUNTER — OUTPATIENT (OUTPATIENT)
Dept: OUTPATIENT SERVICES | Facility: HOSPITAL | Age: 31
LOS: 1 days | End: 2021-02-03

## 2021-02-03 ENCOUNTER — APPOINTMENT (OUTPATIENT)
Dept: OBGYN | Facility: HOSPITAL | Age: 31
End: 2021-02-03
Payer: COMMERCIAL

## 2021-02-03 VITALS
DIASTOLIC BLOOD PRESSURE: 73 MMHG | BODY MASS INDEX: 28 KG/M2 | HEIGHT: 63 IN | HEART RATE: 92 BPM | SYSTOLIC BLOOD PRESSURE: 125 MMHG | TEMPERATURE: 98.1 F | WEIGHT: 158 LBS

## 2021-02-03 DIAGNOSIS — O00.90 UNSPECIFIED. ECTOPIC. PREGNANCY WITHOUT INTRAUTERINE PREGNANCY: ICD-10-CM

## 2021-02-03 PROCEDURE — 99024 POSTOP FOLLOW-UP VISIT: CPT

## 2021-02-04 DIAGNOSIS — O09.90 SUPERVISION OF HIGH RISK PREGNANCY, UNSPECIFIED, UNSPECIFIED TRIMESTER: ICD-10-CM

## 2021-02-08 NOTE — PHYSICAL EXAM
[Appropriately responsive] : appropriately responsive [Alert] : alert [No Acute Distress] : no acute distress [Soft] : soft [Non-tender] : non-tender [Non-distended] : non-distended [FreeTextEntry7] : 3 well healing laparoscopic port site incisions. Slight area of firmness at right aspect of umbilical incision, within the umbilicus. Nontender.

## 2021-02-08 NOTE — HISTORY OF PRESENT ILLNESS
[FreeTextEntry1] : 31y/o  presenting for 2w follow-up after diagnostic laparoscopy with dilation and curettage of uterus for an ectopic pregnancy on 2021.  Pathology returned demonstrated products of conception with chorionic villi (right pelvic mass) and benign endometrium with secretory glandular changes (endometrial curettings). \par \par Patient reports that she has felt well postoperatively. She notes abdominal pain for approximately 3d after her surgery, but states it has since largely resolved. She notes continued vaginal spotting approximately 1w after her surgery, which has also since resolved. She denies nausea, emesis or difficulty tolerating oral intake. She is passing flatus and having regular bowel movements. \par \par She notes that she has also followed-up with her private OBGYN (Dr. Palmer and Dr. Lares). She had a repeat bHCG at their office , which was noted to be <5.

## 2021-06-27 ENCOUNTER — RX RENEWAL (OUTPATIENT)
Age: 31
End: 2021-06-27

## 2021-07-02 ENCOUNTER — RX RENEWAL (OUTPATIENT)
Age: 31
End: 2021-07-02

## 2021-09-27 ENCOUNTER — APPOINTMENT (OUTPATIENT)
Dept: OBGYN | Facility: CLINIC | Age: 31
End: 2021-09-27
Payer: COMMERCIAL

## 2021-09-27 VITALS
HEART RATE: 89 BPM | OXYGEN SATURATION: 98 % | WEIGHT: 152 LBS | BODY MASS INDEX: 26.93 KG/M2 | SYSTOLIC BLOOD PRESSURE: 112 MMHG | HEIGHT: 63 IN | DIASTOLIC BLOOD PRESSURE: 74 MMHG | TEMPERATURE: 97.9 F

## 2021-09-27 DIAGNOSIS — Z01.419 ENCOUNTER FOR GYNECOLOGICAL EXAMINATION (GENERAL) (ROUTINE) W/OUT ABNORMAL FINDINGS: ICD-10-CM

## 2021-09-27 PROCEDURE — 99395 PREV VISIT EST AGE 18-39: CPT

## 2021-09-27 RX ORDER — NORETHINDRONE 0.35 MG
0.35 KIT ORAL AT BEDTIME
Qty: 3 | Refills: 3 | Status: COMPLETED | COMMUNITY
Start: 2020-07-22 | End: 2021-09-27

## 2021-09-27 NOTE — PLAN
[FreeTextEntry1] : patient is asking for ocps.risks&benefits of ocps explained to patient.she understands her condition&agreed for combination birth control

## 2021-09-27 NOTE — PHYSICAL EXAM
[Appropriately responsive] : appropriately responsive [Alert] : alert [No Acute Distress] : no acute distress [No Lymphadenopathy] : no lymphadenopathy [Regular Rate Rhythm] : regular rate rhythm [No Murmurs] : no murmurs [Clear to Auscultation B/L] : clear to auscultation bilaterally [Soft] : soft [Non-tender] : non-tender [Non-distended] : non-distended [No HSM] : No HSM [No Lesions] : no lesions [No Mass] : no mass [Oriented x3] : oriented x3 [Examination Of The Breasts] : a normal appearance [No Masses] : no breast masses were palpable [Labia Majora] : normal [Labia Minora] : normal [Discharge] : discharge [Scant] : scant [Thin] : thin [Normal] : normal [Uterine Adnexae] : normal

## 2021-09-28 LAB
C TRACH RRNA SPEC QL NAA+PROBE: NOT DETECTED
HPV HIGH+LOW RISK DNA PNL CVX: NOT DETECTED
N GONORRHOEA RRNA SPEC QL NAA+PROBE: NOT DETECTED
SOURCE TP AMPLIFICATION: NORMAL

## 2021-10-03 LAB — CYTOLOGY CVX/VAG DOC THIN PREP: NORMAL

## 2022-03-28 NOTE — ED PROVIDER NOTE - ADMIT DISPOSITION PRESENT ON ADMISSION SEPSIS
Abdomen , soft, nontender, nondistended , no guarding or rigidity , no masses palpable , normal bowel sounds , Liver and Spleen , no hepatomegaly present , liver nontender , Rectal, no mass or bleeding present, internal hemorrhoids present, stool brown, Hemosure negative
No

## 2022-04-23 NOTE — ED ADULT NURSE NOTE - NSSUHOSCREENINGYN_ED_ALL_ED
Fell last week, supposed to follow up with dentist on Wednesday, now with abscess to mouth and fever x yesterday 102. Tylenol suppository 3am, 10am Motrin. Dec PO
Yes - the patient is able to be screened

## 2022-06-20 NOTE — PROGRESS NOTE ADULT - PROVIDER SPECIALTY LIST ADULT
Assessment/Plan:  Devon Roth was seen today for consult  Diagnoses and all orders for this visit:    Obesity, Class III, BMI 40-49 9 (morbid obesity) ([facility] [address] )  -     Ambulatory referral to Gastroenterology; Future    Abnormal weight gain  -     Ambulatory referral to Gastroenterology; Future    Mixed hyperlipidemia  -     Ambulatory referral to Gastroenterology; Future    Screening for colon cancer  -     Ambulatory referral to Gastroenterology; Future         - Discussed options of HealthyCORE-Intensive Lifestyle Intervention Program, Very Low Calorie Diet-VLCD, Conservative Program, Lilo-En-Y Gastric Bypass, and Vertical Sleeve Gastrectomy and the role of weight loss medications  - Explained the importance of making lifestyle changes first before starting any anti-obesity medications  Patient should demonstrate lifestyle changes first before anti-obesity medication can be initiated  - Patient is interested in pursuing Conservative Program  - Initial weight loss goal of 5-10% weight loss for improved health  - Weight loss can improve patient's co-morbid conditions and/or prevent weight-related complications  Goals:  Do not skip any meals! Food log (ie ) www myfitnesspal com,sparkpeople  com,loseit com,calorieking  com,etc  baritastic (use skinnytaste  com, dietdoctor  com or smartphone ryan [employer] for recipes)  No sugary beverages  At least 64oz of water daily  Increase physical activity by 10 minutes daily  Gradually increase physical activity to a goal of 5 days per week for 30 minutes of MODERATE intensity PLUS 2 days per week of FULL BODY resistance training (use smartphone apps eMoov, Home Workout, etc )  Goal protein 80-90g per day  Goal carbohydrates 80-90ug per day   7529-4202 calories   Limit caffeine intake to 2 cups  Water 64 oz day  Consider GLP-1 if covered; Patient denies personal and family history of MEN2 tumors and medullary thyroid CA  Patient denies personal history of pancreatitis  OB Total time spent: 45 minute visit, with >50% face-to-face time spent counseling patient on diet behavior and exercise modification for weight loss  Follow up in approximately 3 months with Non-Surgical Physician/Advanced Practitioner  Subjective:   Chief Complaint   Patient presents with    Consult     Initial visit with rizwan       Patient ID: Keyanna Krishnamurthy  is a 45 y o  female with excess weight/obesity here to pursue weight management  Previous notes and records have been reviewed  Past Medical History:   Diagnosis Date    Obesity      Past Surgical History:   Procedure Laterality Date    APPENDECTOMY         HPI:  Wt Readings from Last 20 Encounters:   22 107 kg (236 lb 6 4 oz)       Severity: Severe  Onset:  Over the 4 years; pandemic- working from home, had uterine ablation, menopause  Modifiers: Diet and Exercise, behavioral changes  Contributing factors: Poor Food Choices and Insufficient Physical Activity  Associated symptoms: comorbid conditions  Goal weight loss: 5-10% STG     B- coffee 3-4 cups   S- yogurt berries   L- sometimes skips lunch  S-   D-zucchini, hamburger   S-apple pie    Hydration: <40 oz   Alcohol: no  Smoking: no   Exercise: no  Occupation: director of [employer]; works in [employer]   Sleep: 5 5 hours  STOP ban/8    The following portions of the patient's history were reviewed and updated as appropriate: allergies, current medications, past family history, past medical history, past social history, past surgical history, and problem list     Review of Systems   Constitutional: Negative for appetite change, chills and fever  HENT: Negative for rhinorrhea and sore throat  Respiratory: Negative for cough, chest tightness and shortness of breath  Cardiovascular: Negative for chest pain and leg swelling  Gastrointestinal: Negative for abdominal pain, constipation, diarrhea, nausea and vomiting     Endocrine: Negative for cold intolerance and heat intolerance  Genitourinary: Negative for difficulty urinating  Musculoskeletal: Negative for arthralgias  Skin: Negative for color change  Neurological: Negative for dizziness, numbness and headaches  Psychiatric/Behavioral: Negative for sleep disturbance  The patient is not nervous/anxious  All other systems reviewed and are negative  Objective:  /82 (BP Location: Right arm, Patient Position: Sitting, Cuff Size: Large)   Pulse 85   Temp 99 7 °F (37 6 °C) (Tympanic)   Resp 14   Ht 5' 2" (1 575 m)   Wt 107 kg (236 lb 6 4 oz)   BMI 43 24 kg/m²   Constitutional: Well-developed, well-nourished and obese Body mass index is 43 24 kg/m²  Lico Akers HEENT: No conjunctival pallor or jaundice  Pulmonary: No increased work of breathing or signs of respiratory distress  CV: Normal rate, well-perfused  GI: Obese  Non-distended  MSK: No edema   Neuro: Oriented to person, place and time  Normal Speech  Normal gait  Psych: Normal affect and mood  Labs and Imaging  Recent labs and imaging have been personally reviewed

## 2023-01-22 ENCOUNTER — NON-APPOINTMENT (OUTPATIENT)
Age: 33
End: 2023-01-22

## 2023-03-04 NOTE — ED PROVIDER NOTE - NS ED MD DISPO ADMITTING SERVICE
Poison control called and updated on pt. Recommends seizure precautions if not already started, no need for charcoal due to time frame, recommends getting a CK level, continued observation and supportive care. Dr Melton aware     OBGYN

## 2023-03-15 ENCOUNTER — APPOINTMENT (OUTPATIENT)
Dept: OBGYN | Facility: CLINIC | Age: 33
End: 2023-03-15
Payer: COMMERCIAL

## 2023-03-15 VITALS
WEIGHT: 148 LBS | SYSTOLIC BLOOD PRESSURE: 129 MMHG | BODY MASS INDEX: 29.06 KG/M2 | DIASTOLIC BLOOD PRESSURE: 81 MMHG | HEIGHT: 60 IN | TEMPERATURE: 97.6 F | OXYGEN SATURATION: 100 %

## 2023-03-15 PROCEDURE — 99214 OFFICE O/P EST MOD 30 MIN: CPT | Mod: 25

## 2023-03-15 PROCEDURE — 76815 OB US LIMITED FETUS(S): CPT | Mod: 59

## 2023-03-15 PROCEDURE — 81025 URINE PREGNANCY TEST: CPT

## 2023-03-15 RX ORDER — NORGESTIMATE AND ETHINYL ESTRADIOL 7DAYSX3 28
0.18/0.215/0.25 KIT ORAL DAILY
Qty: 3 | Refills: 3 | Status: COMPLETED | COMMUNITY
Start: 2021-09-27 | End: 2023-03-15

## 2023-03-15 RX ORDER — ALBUTEROL SULFATE 90 UG/1
108 (90 BASE) INHALANT RESPIRATORY (INHALATION)
Qty: 18 | Refills: 0 | Status: ACTIVE | COMMUNITY
Start: 2021-07-29

## 2023-03-15 RX ORDER — HALOBETASOL PROPIONATE 0.5 MG/G
0.05 CREAM TOPICAL
Qty: 50 | Refills: 0 | Status: COMPLETED | COMMUNITY
Start: 2021-06-29 | End: 2023-03-15

## 2023-03-15 RX ORDER — PRENATAL VIT NO.130/IRON/FOLIC 27MG-0.8MG
27-0.8 TABLET ORAL
Refills: 0 | Status: ACTIVE | COMMUNITY

## 2023-03-15 NOTE — HISTORY OF PRESENT ILLNESS
[Normal Amount/Duration] :  normal amount and duration [Regular Cycle Intervals] : periods have been regular [Frequency: Q ___ days] : menstrual periods occur approximately every [unfilled] days [Menarche Age: ____] : age at menarche was [unfilled] [HCG+: ___(Date)] : a positive HCG was recorded on [unfilled] [FreeTextEntry1] : 02/09/2023 [Currently Active] : currently active [Men] : men [Vaginal] : vaginal [No] : No

## 2023-03-15 NOTE — PHYSICAL EXAM
[Chaperone Present] : A chaperone was present in the examining room during all aspects of the physical examination [Appropriately responsive] : appropriately responsive [Alert] : alert [No Acute Distress] : no acute distress [No Lymphadenopathy] : no lymphadenopathy [Regular Rate Rhythm] : regular rate rhythm [No Murmurs] : no murmurs [Clear to Auscultation B/L] : clear to auscultation bilaterally [Soft] : soft [Non-tender] : non-tender [Non-distended] : non-distended [No HSM] : No HSM [No Lesions] : no lesions [No Mass] : no mass [Oriented x3] : oriented x3 [Examination Of The Breasts] : a normal appearance [No Masses] : no breast masses were palpable [Labia Majora] : normal [Labia Minora] : normal [Normal] : normal [Normal Position] : in a normal position [Enlarged ___ wks] : enlarged [unfilled] ~Uweeks [Uterine Adnexae] : normal

## 2023-03-16 LAB
APPEARANCE: ABNORMAL
BACTERIA: NEGATIVE
BILIRUBIN URINE: NEGATIVE
BLOOD URINE: NEGATIVE
C TRACH RRNA SPEC QL NAA+PROBE: NOT DETECTED
CANDIDA VAG CYTO: DETECTED
COLOR: COLORLESS
G VAGINALIS+PREV SP MTYP VAG QL MICRO: NOT DETECTED
GLUCOSE QUALITATIVE U: NEGATIVE
HYALINE CASTS: 10 /LPF
KETONES URINE: NEGATIVE
LEUKOCYTE ESTERASE URINE: ABNORMAL
MICROSCOPIC-UA: NORMAL
N GONORRHOEA RRNA SPEC QL NAA+PROBE: NOT DETECTED
NITRITE URINE: NEGATIVE
PH URINE: 6.5
PROTEIN URINE: NEGATIVE
RED BLOOD CELLS URINE: 0 /HPF
SOURCE AMPLIFICATION: NORMAL
SPECIFIC GRAVITY URINE: 1
SQUAMOUS EPITHELIAL CELLS: 5 /HPF
T VAGINALIS VAG QL WET PREP: NOT DETECTED
URINE COMMENTS: NORMAL
UROBILINOGEN URINE: NORMAL
WHITE BLOOD CELLS URINE: 40 /HPF

## 2023-03-17 LAB — BACTERIA UR CULT: NORMAL

## 2023-03-19 LAB
BASOPHILS # BLD AUTO: 0.08 K/UL
BASOPHILS NFR BLD AUTO: 0.7 %
EOSINOPHIL # BLD AUTO: 0.19 K/UL
EOSINOPHIL NFR BLD AUTO: 1.7 %
HCG SERPL-MCNC: ABNORMAL MIU/ML
HCT VFR BLD CALC: 38.3 %
HGB BLD-MCNC: 11.4 G/DL
IMM GRANULOCYTES NFR BLD AUTO: 0.9 %
LYMPHOCYTES # BLD AUTO: 2.5 K/UL
LYMPHOCYTES NFR BLD AUTO: 22.9 %
MAN DIFF?: NORMAL
MCHC RBC-ENTMCNC: 25.2 PG
MCHC RBC-ENTMCNC: 29.8 GM/DL
MCV RBC AUTO: 84.7 FL
MONOCYTES # BLD AUTO: 0.58 K/UL
MONOCYTES NFR BLD AUTO: 5.3 %
NEUTROPHILS # BLD AUTO: 7.46 K/UL
NEUTROPHILS NFR BLD AUTO: 68.5 %
PLATELET # BLD AUTO: 461 K/UL
PROGEST SERPL-MCNC: 26.7 NG/ML
RBC # BLD: 4.52 M/UL
RBC # FLD: 16.6 %
WBC # FLD AUTO: 10.91 K/UL

## 2023-03-20 LAB
ABO + RH PNL BLD: NORMAL
BLD GP AB SCN SERPL QL: NORMAL

## 2023-03-28 ENCOUNTER — APPOINTMENT (OUTPATIENT)
Dept: OBGYN | Facility: CLINIC | Age: 33
End: 2023-03-28
Payer: COMMERCIAL

## 2023-03-28 VITALS
HEART RATE: 85 BPM | DIASTOLIC BLOOD PRESSURE: 78 MMHG | TEMPERATURE: 97.2 F | BODY MASS INDEX: 26.58 KG/M2 | OXYGEN SATURATION: 100 % | SYSTOLIC BLOOD PRESSURE: 116 MMHG | WEIGHT: 150 LBS | HEIGHT: 63 IN

## 2023-03-28 PROCEDURE — 76815 OB US LIMITED FETUS(S): CPT | Mod: 59

## 2023-03-28 PROCEDURE — 0502F SUBSEQUENT PRENATAL CARE: CPT | Mod: 25

## 2023-03-28 RX ORDER — TERCONAZOLE 8 MG/G
0.8 CREAM VAGINAL
Qty: 1 | Refills: 1 | Status: COMPLETED | COMMUNITY
Start: 2023-03-16 | End: 2023-03-28

## 2023-03-29 LAB
APPEARANCE: CLEAR
BACTERIA: NEGATIVE
BILIRUBIN URINE: NEGATIVE
BLOOD URINE: NEGATIVE
COLOR: YELLOW
GLUCOSE QUALITATIVE U: NEGATIVE
HIV1+2 AB SPEC QL IA.RAPID: NONREACTIVE
HYALINE CASTS: 1 /LPF
KETONES URINE: NEGATIVE
LEUKOCYTE ESTERASE URINE: ABNORMAL
MICROSCOPIC-UA: NORMAL
NITRITE URINE: NEGATIVE
PH URINE: 6.5
PROTEIN URINE: NORMAL
RED BLOOD CELLS URINE: 2 /HPF
RUBV IGG FLD-ACNC: 3.6 INDEX
RUBV IGG SER-IMP: POSITIVE
SPECIFIC GRAVITY URINE: 1.01
SQUAMOUS EPITHELIAL CELLS: 7 /HPF
T PALLIDUM AB SER QL IA: NEGATIVE
UROBILINOGEN URINE: NORMAL
WHITE BLOOD CELLS URINE: 7 /HPF

## 2023-03-30 LAB
BACTERIA UR CULT: NORMAL
HBV SURFACE AG SER QL: NONREACTIVE
HCV AB SER QL: NONREACTIVE
HCV S/CO RATIO: 0.23 S/CO
LEAD BLD-MCNC: <1 UG/DL

## 2023-04-03 LAB
M TB IFN-G BLD-IMP: NEGATIVE
QUANTIFERON TB PLUS MITOGEN MINUS NIL: 5.06 IU/ML
QUANTIFERON TB PLUS NIL: 0.02 IU/ML
QUANTIFERON TB PLUS TB1 MINUS NIL: 0 IU/ML
QUANTIFERON TB PLUS TB2 MINUS NIL: 0 IU/ML

## 2023-04-25 ENCOUNTER — APPOINTMENT (OUTPATIENT)
Dept: OBGYN | Facility: CLINIC | Age: 33
End: 2023-04-25

## 2023-05-01 ENCOUNTER — APPOINTMENT (OUTPATIENT)
Dept: OBGYN | Facility: CLINIC | Age: 33
End: 2023-05-01
Payer: COMMERCIAL

## 2023-05-01 VITALS
BODY MASS INDEX: 25.52 KG/M2 | WEIGHT: 144 LBS | HEART RATE: 85 BPM | HEIGHT: 63 IN | SYSTOLIC BLOOD PRESSURE: 119 MMHG | DIASTOLIC BLOOD PRESSURE: 78 MMHG

## 2023-05-01 PROCEDURE — 76816 OB US FOLLOW-UP PER FETUS: CPT

## 2023-05-01 PROCEDURE — 99213 OFFICE O/P EST LOW 20 MIN: CPT

## 2023-05-01 PROCEDURE — 76817 TRANSVAGINAL US OBSTETRIC: CPT

## 2023-05-02 LAB
ABO + RH PNL BLD: NORMAL
ALBUMIN SERPL ELPH-MCNC: 4.6 G/DL
ALP BLD-CCNC: 86 U/L
ALT SERPL-CCNC: 24 U/L
ANION GAP SERPL CALC-SCNC: 20 MMOL/L
AST SERPL-CCNC: 16 U/L
BASOPHILS # BLD AUTO: 0.08 K/UL
BASOPHILS NFR BLD AUTO: 0.5 %
BILIRUB SERPL-MCNC: <0.2 MG/DL
BLD GP AB SCN SERPL QL: NORMAL
BUN SERPL-MCNC: 9 MG/DL
C TRACH RRNA SPEC QL NAA+PROBE: NOT DETECTED
CALCIUM SERPL-MCNC: 9.7 MG/DL
CHLORIDE SERPL-SCNC: 95 MMOL/L
CO2 SERPL-SCNC: 21 MMOL/L
CREAT SERPL-MCNC: 0.53 MG/DL
EGFR: 125 ML/MIN/1.73M2
EOSINOPHIL # BLD AUTO: 0.13 K/UL
EOSINOPHIL NFR BLD AUTO: 0.9 %
ESTIMATED AVERAGE GLUCOSE: 103 MG/DL
GLUCOSE SERPL-MCNC: 40 MG/DL
HBA1C MFR BLD HPLC: 5.2 %
HCT VFR BLD CALC: 42.6 %
HGB A MFR BLD: 97.6 %
HGB A2 MFR BLD: 2.4 %
HGB BLD-MCNC: 13.7 G/DL
HGB FRACT BLD-IMP: NORMAL
IMM GRANULOCYTES NFR BLD AUTO: 0.8 %
LYMPHOCYTES # BLD AUTO: 2.62 K/UL
LYMPHOCYTES NFR BLD AUTO: 17.9 %
MAN DIFF?: NORMAL
MCHC RBC-ENTMCNC: 27.1 PG
MCHC RBC-ENTMCNC: 32.2 GM/DL
MCV RBC AUTO: 84.2 FL
MONOCYTES # BLD AUTO: 0.98 K/UL
MONOCYTES NFR BLD AUTO: 6.7 %
N GONORRHOEA RRNA SPEC QL NAA+PROBE: NOT DETECTED
NEUTROPHILS # BLD AUTO: 10.7 K/UL
NEUTROPHILS NFR BLD AUTO: 73.2 %
PLATELET # BLD AUTO: 422 K/UL
POTASSIUM SERPL-SCNC: 4.1 MMOL/L
PROT SERPL-MCNC: 7.1 G/DL
RBC # BLD: 5.06 M/UL
RBC # FLD: 17.9 %
SODIUM SERPL-SCNC: 136 MMOL/L
SOURCE AMPLIFICATION: NORMAL
TSH SERPL-ACNC: 0.01 UIU/ML
WBC # FLD AUTO: 14.62 K/UL

## 2023-05-03 ENCOUNTER — LABORATORY RESULT (OUTPATIENT)
Age: 33
End: 2023-05-03

## 2023-05-03 LAB
BACTERIA UR CULT: NORMAL
LEAD BLD-MCNC: <1 UG/DL
M TB IFN-G BLD-IMP: NEGATIVE
MEV IGG FLD QL IA: 42.6 AU/ML
MEV IGG+IGM SER-IMP: POSITIVE
QUANTIFERON TB PLUS MITOGEN MINUS NIL: 2.26 IU/ML
QUANTIFERON TB PLUS NIL: 0.02 IU/ML
QUANTIFERON TB PLUS TB1 MINUS NIL: 0 IU/ML
QUANTIFERON TB PLUS TB2 MINUS NIL: -0.01 IU/ML
RUBV IGG FLD-ACNC: 3.6 INDEX
RUBV IGG SER-IMP: POSITIVE
T GONDII AB SER-IMP: NEGATIVE
T GONDII AB SER-IMP: NEGATIVE
T GONDII IGG SER QL: <3 IU/ML
T GONDII IGM SER QL: <3 AU/ML
VZV AB TITR SER: POSITIVE
VZV IGG SER IF-ACNC: 1652 INDEX

## 2023-05-04 ENCOUNTER — NON-APPOINTMENT (OUTPATIENT)
Age: 33
End: 2023-05-04

## 2023-05-05 ENCOUNTER — TRANSCRIPTION ENCOUNTER (OUTPATIENT)
Age: 33
End: 2023-05-05

## 2023-05-05 LAB
FMR1 GENE MUT ANL BLD/T: NORMAL
TSH SERPL-ACNC: 0.01 UIU/ML

## 2023-05-08 ENCOUNTER — NON-APPOINTMENT (OUTPATIENT)
Age: 33
End: 2023-05-08

## 2023-05-08 LAB
B19V IGG SER QL IA: 3.56 INDEX
B19V IGG+IGM SER-IMP: NORMAL
B19V IGG+IGM SER-IMP: POSITIVE
B19V IGM FLD-ACNC: 0.13 INDEX
B19V IGM SER-ACNC: NEGATIVE

## 2023-05-10 ENCOUNTER — APPOINTMENT (OUTPATIENT)
Dept: OBGYN | Facility: CLINIC | Age: 33
End: 2023-05-10

## 2023-05-10 LAB
AR GENE MUT ANL BLD/T: NORMAL
CFTR MUT TESTED BLD/T: NEGATIVE

## 2023-05-15 ENCOUNTER — ASOB RESULT (OUTPATIENT)
Age: 33
End: 2023-05-15

## 2023-05-15 ENCOUNTER — APPOINTMENT (OUTPATIENT)
Dept: ANTEPARTUM | Facility: CLINIC | Age: 33
End: 2023-05-15
Payer: COMMERCIAL

## 2023-05-15 ENCOUNTER — NON-APPOINTMENT (OUTPATIENT)
Age: 33
End: 2023-05-15

## 2023-05-15 PROCEDURE — 76813 OB US NUCHAL MEAS 1 GEST: CPT

## 2023-05-15 PROCEDURE — 76801 OB US < 14 WKS SINGLE FETUS: CPT | Mod: 59

## 2023-05-17 ENCOUNTER — NON-APPOINTMENT (OUTPATIENT)
Age: 33
End: 2023-05-17

## 2023-06-05 ENCOUNTER — LABORATORY RESULT (OUTPATIENT)
Age: 33
End: 2023-06-05

## 2023-06-05 ENCOUNTER — APPOINTMENT (OUTPATIENT)
Dept: OBGYN | Facility: CLINIC | Age: 33
End: 2023-06-05
Payer: COMMERCIAL

## 2023-06-05 VITALS
DIASTOLIC BLOOD PRESSURE: 75 MMHG | BODY MASS INDEX: 26.36 KG/M2 | HEART RATE: 89 BPM | HEIGHT: 63 IN | WEIGHT: 148.8 LBS | SYSTOLIC BLOOD PRESSURE: 114 MMHG

## 2023-06-05 PROCEDURE — 0502F SUBSEQUENT PRENATAL CARE: CPT

## 2023-06-06 ENCOUNTER — TRANSCRIPTION ENCOUNTER (OUTPATIENT)
Age: 33
End: 2023-06-06

## 2023-06-06 LAB — TSH SERPL-ACNC: 0.19 UIU/ML

## 2023-06-10 LAB
AFP MOM: 1.13
AFP VALUE: 41.5 NG/ML
ALPHA FETOPROTEIN SERUM COMMENT: NORMAL
ALPHA FETOPROTEIN SERUM INTERPRETATION: NORMAL
ALPHA FETOPROTEIN SERUM RESULTS: NORMAL
ALPHA FETOPROTEIN SERUM TEST RESULTS: NORMAL
GESTATIONAL AGE BASED ON: NORMAL
GESTATIONAL AGE ON COLLECTION DATE: 16.4 WEEKS
INSULIN DEP DIABETES: NO
MATERNAL AGE AT EDD AFP: 34.2 YR
MULTIPLE GESTATION: NO
OSBR RISK 1 IN: 8106
RACE: NORMAL
WEIGHT AFP: 148 LBS

## 2023-07-03 ENCOUNTER — APPOINTMENT (OUTPATIENT)
Dept: ANTEPARTUM | Facility: CLINIC | Age: 33
End: 2023-07-03

## 2023-07-07 ENCOUNTER — NON-APPOINTMENT (OUTPATIENT)
Age: 33
End: 2023-07-07

## 2023-07-07 ENCOUNTER — APPOINTMENT (OUTPATIENT)
Dept: OBGYN | Facility: CLINIC | Age: 33
End: 2023-07-07
Payer: COMMERCIAL

## 2023-07-07 VITALS
BODY MASS INDEX: 26.58 KG/M2 | WEIGHT: 150 LBS | DIASTOLIC BLOOD PRESSURE: 69 MMHG | SYSTOLIC BLOOD PRESSURE: 103 MMHG | HEIGHT: 63 IN

## 2023-07-07 PROCEDURE — 0502F SUBSEQUENT PRENATAL CARE: CPT

## 2023-07-08 LAB — TSH SERPL-ACNC: 0.43 UIU/ML

## 2023-07-14 ENCOUNTER — NON-APPOINTMENT (OUTPATIENT)
Age: 33
End: 2023-07-14

## 2023-07-14 ENCOUNTER — EMERGENCY (EMERGENCY)
Facility: HOSPITAL | Age: 33
LOS: 1 days | Discharge: HOME | End: 2023-07-14
Attending: EMERGENCY MEDICINE | Admitting: EMERGENCY MEDICINE
Payer: COMMERCIAL

## 2023-07-14 VITALS
SYSTOLIC BLOOD PRESSURE: 117 MMHG | OXYGEN SATURATION: 100 % | RESPIRATION RATE: 16 BRPM | HEART RATE: 96 BPM | TEMPERATURE: 98 F | DIASTOLIC BLOOD PRESSURE: 73 MMHG

## 2023-07-14 LAB
ALBUMIN SERPL ELPH-MCNC: 4 G/DL — SIGNIFICANT CHANGE UP (ref 3.3–5)
ALP SERPL-CCNC: 73 U/L — SIGNIFICANT CHANGE UP (ref 40–120)
ALT FLD-CCNC: 21 U/L — SIGNIFICANT CHANGE UP (ref 4–33)
ANION GAP SERPL CALC-SCNC: 16 MMOL/L — HIGH (ref 7–14)
APPEARANCE UR: ABNORMAL
AST SERPL-CCNC: 18 U/L — SIGNIFICANT CHANGE UP (ref 4–32)
BACTERIA # UR AUTO: ABNORMAL /HPF
BASOPHILS # BLD AUTO: 0.04 K/UL — SIGNIFICANT CHANGE UP (ref 0–0.2)
BASOPHILS NFR BLD AUTO: 0.3 % — SIGNIFICANT CHANGE UP (ref 0–2)
BILIRUB SERPL-MCNC: <0.2 MG/DL — SIGNIFICANT CHANGE UP (ref 0.2–1.2)
BILIRUB UR-MCNC: NEGATIVE — SIGNIFICANT CHANGE UP
BUN SERPL-MCNC: 7 MG/DL — SIGNIFICANT CHANGE UP (ref 7–23)
CALCIUM SERPL-MCNC: 9.5 MG/DL — SIGNIFICANT CHANGE UP (ref 8.4–10.5)
CAST: 2 /LPF — SIGNIFICANT CHANGE UP (ref 0–4)
CHLORIDE SERPL-SCNC: 100 MMOL/L — SIGNIFICANT CHANGE UP (ref 98–107)
CO2 SERPL-SCNC: 22 MMOL/L — SIGNIFICANT CHANGE UP (ref 22–31)
COLOR SPEC: YELLOW — SIGNIFICANT CHANGE UP
CREAT SERPL-MCNC: 0.48 MG/DL — LOW (ref 0.5–1.3)
DIFF PNL FLD: NEGATIVE — SIGNIFICANT CHANGE UP
EGFR: 128 ML/MIN/1.73M2 — SIGNIFICANT CHANGE UP
EOSINOPHIL # BLD AUTO: 0.08 K/UL — SIGNIFICANT CHANGE UP (ref 0–0.5)
EOSINOPHIL NFR BLD AUTO: 0.6 % — SIGNIFICANT CHANGE UP (ref 0–6)
GLUCOSE SERPL-MCNC: 86 MG/DL — SIGNIFICANT CHANGE UP (ref 70–99)
GLUCOSE UR QL: NEGATIVE MG/DL — SIGNIFICANT CHANGE UP
HCT VFR BLD CALC: 37.5 % — SIGNIFICANT CHANGE UP (ref 34.5–45)
HGB BLD-MCNC: 12.3 G/DL — SIGNIFICANT CHANGE UP (ref 11.5–15.5)
IANC: 10.63 K/UL — HIGH (ref 1.8–7.4)
IMM GRANULOCYTES NFR BLD AUTO: 1.4 % — HIGH (ref 0–0.9)
KETONES UR-MCNC: ABNORMAL MG/DL
LEUKOCYTE ESTERASE UR-ACNC: ABNORMAL
LYMPHOCYTES # BLD AUTO: 1.86 K/UL — SIGNIFICANT CHANGE UP (ref 1–3.3)
LYMPHOCYTES # BLD AUTO: 13.7 % — SIGNIFICANT CHANGE UP (ref 13–44)
MAGNESIUM SERPL-MCNC: 1.9 MG/DL — SIGNIFICANT CHANGE UP (ref 1.6–2.6)
MCHC RBC-ENTMCNC: 28.9 PG — SIGNIFICANT CHANGE UP (ref 27–34)
MCHC RBC-ENTMCNC: 32.8 GM/DL — SIGNIFICANT CHANGE UP (ref 32–36)
MCV RBC AUTO: 88 FL — SIGNIFICANT CHANGE UP (ref 80–100)
MONOCYTES # BLD AUTO: 0.81 K/UL — SIGNIFICANT CHANGE UP (ref 0–0.9)
MONOCYTES NFR BLD AUTO: 6 % — SIGNIFICANT CHANGE UP (ref 2–14)
NEUTROPHILS # BLD AUTO: 10.63 K/UL — HIGH (ref 1.8–7.4)
NEUTROPHILS NFR BLD AUTO: 78 % — HIGH (ref 43–77)
NITRITE UR-MCNC: NEGATIVE — SIGNIFICANT CHANGE UP
NRBC # BLD: 0 /100 WBCS — SIGNIFICANT CHANGE UP (ref 0–0)
NRBC # FLD: 0 K/UL — SIGNIFICANT CHANGE UP (ref 0–0)
PH UR: 6 — SIGNIFICANT CHANGE UP (ref 5–8)
PHOSPHATE SERPL-MCNC: 3.4 MG/DL — SIGNIFICANT CHANGE UP (ref 2.5–4.5)
PLATELET # BLD AUTO: 328 K/UL — SIGNIFICANT CHANGE UP (ref 150–400)
POTASSIUM SERPL-MCNC: 3.7 MMOL/L — SIGNIFICANT CHANGE UP (ref 3.5–5.3)
POTASSIUM SERPL-SCNC: 3.7 MMOL/L — SIGNIFICANT CHANGE UP (ref 3.5–5.3)
PROT SERPL-MCNC: 6.9 G/DL — SIGNIFICANT CHANGE UP (ref 6–8.3)
PROT UR-MCNC: SIGNIFICANT CHANGE UP MG/DL
RBC # BLD: 4.26 M/UL — SIGNIFICANT CHANGE UP (ref 3.8–5.2)
RBC # FLD: 13.8 % — SIGNIFICANT CHANGE UP (ref 10.3–14.5)
RBC CASTS # UR COMP ASSIST: 0 /HPF — SIGNIFICANT CHANGE UP (ref 0–4)
SODIUM SERPL-SCNC: 138 MMOL/L — SIGNIFICANT CHANGE UP (ref 135–145)
SP GR SPEC: 1.02 — SIGNIFICANT CHANGE UP (ref 1–1.03)
SQUAMOUS # UR AUTO: 11 /HPF — HIGH (ref 0–5)
UROBILINOGEN FLD QL: 0.2 MG/DL — SIGNIFICANT CHANGE UP (ref 0.2–1)
WBC # BLD: 13.61 K/UL — HIGH (ref 3.8–10.5)
WBC # FLD AUTO: 13.61 K/UL — HIGH (ref 3.8–10.5)
WBC UR QL: 53 /HPF — HIGH (ref 0–5)

## 2023-07-14 PROCEDURE — 99285 EMERGENCY DEPT VISIT HI MDM: CPT

## 2023-07-14 PROCEDURE — 93010 ELECTROCARDIOGRAM REPORT: CPT

## 2023-07-14 RX ORDER — SODIUM CHLORIDE 9 MG/ML
1000 INJECTION INTRAMUSCULAR; INTRAVENOUS; SUBCUTANEOUS ONCE
Refills: 0 | Status: COMPLETED | OUTPATIENT
Start: 2023-07-14 | End: 2023-07-14

## 2023-07-14 RX ADMIN — SODIUM CHLORIDE 1000 MILLILITER(S): 9 INJECTION INTRAMUSCULAR; INTRAVENOUS; SUBCUTANEOUS at 19:56

## 2023-07-14 RX ADMIN — SODIUM CHLORIDE 1000 MILLILITER(S): 9 INJECTION INTRAMUSCULAR; INTRAVENOUS; SUBCUTANEOUS at 19:10

## 2023-07-14 RX ADMIN — SODIUM CHLORIDE 1000 MILLILITER(S): 9 INJECTION INTRAMUSCULAR; INTRAVENOUS; SUBCUTANEOUS at 19:58

## 2023-07-14 NOTE — ED PROVIDER NOTE - OBJECTIVE STATEMENT
31y/o F pt A1 (ectopic in 2021) currently 22wks pregnant presenting to [facility] ED c/o an episode of vision and hearing loss today.  Pt reports gradual onset complete vision loss to b/l eyes and gradual onset partial hearing loss to b/l ears occurring at approx. 11:45am today.  The episode lasted for approx. 2min, she felt hot flashes / clammy and dizziness during this episode, describing a loss of balance, the episode self-resolved and her vision and hearing returned to normal.  She called her OB several hours later and was advised to come into the ED for further evaluation.  She has had multiple similar episodes previously since she was a child, her mother also has similar episodes, pts most recent episode prior to today was in 2023.  Denies headache, eye pain, ear pain, back pain, LOC, confusion, abdominal pain, N/V/D, constipation, CP, SOB, chest heaviness, numbness, tingling, urinary frequency, dysuria, hematuria.  Pt is following with OBGYN Dr. Yo Larson, her pregnancy has been normal to date, normal bloodwork and BP, UTD on vaccinations.

## 2023-07-14 NOTE — ED PROVIDER NOTE - PHYSICAL EXAMINATION
Const: Awake, alert, no acute distress.  Well appearing.  Moving comfortably on stretcher.  HEENT: NC/AT.  Moist mucous membranes.  No pharyngeal erythema, no exudates.  Eyes: Extraocular movements intact b/l.  Pupils equal, round, and reactive to light and accommodation b/l.  Conjunctiva pink.  No scleral icterus.  Visual acuity intact b/l, visual fields intact b/l.  Neck: Neck supple, full ROM without pain.  Cardiac: Regular rate and regular rhythm. S1 S2 present.  Peripheral pulses 2+ and symmetric. No LE edema.  Resp: Speaking in full sentences. No evidence of respiratory distress.  Breath sounds clear to auscultation b/l.  Abd: Non-distended, no overlying skin changes.  Soft, non-tender, no guarding, no rigidity, no rebound tenderness.  No palpable masses.  Normal bowel sounds in all 4 quadrants.  Back: Spine midline and non-tender. No CVAT.  Skin: Normal coloration.  No rashes, abrasions or lacerations.  Neuro: Awake, alert & oriented x 3.  Moves all extremities symmetrically.  No focal deficits.  CN II-XII intact.

## 2023-07-14 NOTE — ED PROVIDER NOTE - NSFOLLOWUPCLINICS_GEN_ALL_ED_FT
Cardiology at [facility]  Cardiology  [address] [place] [address]  Phone: [phone]  Fax:     Cardiology at [facility]  Cardiology  [address] [place] [address]  Phone: [phone]  Fax:     [facility] Internal Medicine  General Internal Medicine  [address] [place] [address]  Phone: [phone]  Fax:     [facility] at [place]  Internal Medicine  [address] [place] [address]  Phone: [phone]  Fax: [phone]    [facility]  Neurology  [address], [facility] - [address]rd [place] [address]  Phone: [phone]  Fax:     Jake Kanwal [facility]  Internal Medicine  [address] [place] [address]  Phone: [phone]  Fax: [phone]

## 2023-07-14 NOTE — ED PROVIDER NOTE - NSFOLLOWUPINSTRUCTIONS_ED_ALL_ED_FT
You were evaluated in the emergency department for acute episode of vision loss and hearing loss that self resolved.  Your work-up showed that you had possible evidence of urinary tract infection however the urine sample was contaminated by skin cells.  A culture of your urine was taken, if it is positive you will receive a call and have antibiotics sent to your pharmacy.  Please follow-up with your OB/GYN within the week, please follow-up with your primary care doctor as well within the week.    If you experience recurrent symptoms, pass-out, have a headache, shortness of breath, elevated blood pressure greater than 140 systolic, abdominal pain vomiting fever cough please return to the emergency department.

## 2023-07-14 NOTE — ED ADULT TRIAGE NOTE - CHIEF COMPLAINT QUOTE
Pt sent by PCP with   blurry vision  and hearing loss , cold /clammy lasting few seconds at 1144am.   Resolved by 12 . Pt is 22 weeks pregnant.  Dr Leija called for eval.  L&D called. Pt to be seen in ED

## 2023-07-14 NOTE — ED PROVIDER NOTE - PROGRESS NOTE DETAILS
Gurmeet PGY-1: elevated WBC count seen on CBC, pt with no clinical signs of infection, no fevers, no urinary symptoms or respiratory symptoms, will check urine to assess for bacteriuria. Marlon PGY 3 Patient reassessed states she is feeling better no acute distress Keri Cardenas MD, PGY2:  Reviewed all results of labs, UA shows moderate leuk esterases with elevated white blood cell count, moderate bacteria however there is epithelial cells on UA concerning for possible contamination.     discussed with bill from obgyn: hold off on abx pending cx, no further intervention required from OB/GYN perspective okay to discharge per ED team    discussed all findings and plan for dc with outpt f/u with pt, all questions answered, return precautions given, pt ok for dc

## 2023-07-14 NOTE — ED PROVIDER NOTE - NSFOLLOWUPCLINICSTOKEN_GEN_ALL_ED_FT
[id number]: || ||00\01||False;[id number]: || ||00\01||False;[id number]: || ||00\01||False;[id number]: || ||00\01||False;[id number]: || ||00\01||False;[id number]: || ||00\01||False;

## 2023-07-14 NOTE — ED PROVIDER NOTE - PATIENT PORTAL LINK FT
You can access the FollowMyHealth Patient Portal offered by [facility] by registering at the following website: [email or web address]. By joining [employer]’s FollowMyHealth portal, you will also be able to view your health information using other applications (apps) compatible with our system.

## 2023-07-14 NOTE — ED PROVIDER NOTE - EKG ADDITIONAL INFORMATION FREE TEXT
Michell Fonseca MD attending physician.  My interpretation of EKG shows sinus rhythm rate of 92 no ischemic changes no ST elevations or depressions no short AR QTc is 467 no signs of hokum or Brugada

## 2023-07-14 NOTE — ED ADULT NURSE NOTE - OBJECTIVE STATEMENT
Patient is a 31 yo female, h/x asthma, 22 weeks pregnant, presenting with loss of vision and hearing for a few minutes today at 11:45 am. Received to room 7,  team at bedside for huddle. AAOx4, no signs of distress, reports all symptoms resolved within 2 minutes, denies LOC, currently denies any complaints including dizziness, CP, SOB, nausea, vomiting, numbness, tingling, headache, blurry vision. VSS. 20G PIV placed to LAC, labs sent per orders. Fall precautions maintained.

## 2023-07-14 NOTE — ED PROVIDER NOTE - DATE/TIME 1
[facility] Hospitalist Discharge Summary    Jessica Ellison MRN# [id number]   Age: 67 year old YOB: 1953     Date of Admission:  12/22/2020  Date of Discharge::  12/25/2020  Admitting Physician:  Selena Crespo MD  Discharge Physician:  Selena Crespo MD  Primary Physician: Sarah Barriga  Transferring Facility: N/A     Home clinic: [facility]          Admission Diagnoses:   Shortness of breath [R06.02]  Abnormal CT of the chest [R93.89]          Discharge Diagnosis:     Principle diagnosis: Acute respiratory failure with hypoxia  Secondary diagnoses:  Patient Active Problem List   Diagnosis     Insomnia     Back pain     ETOH abuse     CARDIOVASCULAR SCREENING; LDL GOAL LESS THAN 160     Anxiety     Advanced directives, counseling/discussion     Major depressive disorder, recurrent episode, moderate (H)     Cerebral aneurysm, nonruptured     Bee allergy status     S/P lumbar spinal fusion     Chronic bilateral low back pain without sciatica     Essential hypertension with goal blood pressure less than 140/90     Hypothyroidism     Spell of change in speech     Chronic obstructive pulmonary disease, unspecified COPD type (H)     Acute respiratory failure with hypoxia (H)     Suspected COVID-19 virus infection     Hyperlipidemia          Brief History of Presenting Illness:   As per admit hx  Jessica Ellison is a 67 year old female who presented to the emergency department for evaluation of progressively worsening shortness of breath.     Symptoms started between 2-3 weeks ago, around December 3rd and have been progressively worsening since onset.  Today she was seen in clinic and was noted to be hypoxic, sent to the emergency department.  She feels short of breath at rest, but worsens with exertion.   Has a cough, initially worse but now slightly improved compared to a few weeks ago.  Has felt feverish but no documented fever at home (max temperature around 99.8), no significant  chills.  Some myalgias, although has chronic pain at baseline.   Has some throat discomfort / tightness but not pain.  Some nasal congestion          Hospital Course:     Acute respiratory failure with hypoxia  Presents with O2 sat 88% on room air, improves to 91% on 2L. VBG normal. Chest CT PE shows emphysematous changes, no PE, and bilateral groundglass opacities / interstitial infiltrates. Likely chronic interstitial changes with possible bacterial superinfection.  Resolved. o2 sats stable on RA at rest and ambulation     Suspected COVID-19 virus infection-ruled-out. Possible CAP on chronic interstitial changes from emphysema  Presents with hypoxia, ground glass opacities on chest CT, elevated d-dimer. Onset of symptoms around 12/3. History sounds moderately concerning for COVID, although no known exposures.   Initial PCR negative on 12/22/2020. Pt had become leucopenic yesterday 12/23. PCT was 0.53. started on emp rocephin/ zithromax.Repeat PCR on 12/25/20 is negative.  Will rx with levaquin x 7 days and complete total 7 days steroids.     Recent throat tightness / swelling, possible ACE induced angioedema  Evaluated in emergency department 12/3 for throat tightness, thought possibly to be angioedema due to lisinopril. Has been off of lisinopril since 12/3, throat tightness still present but improving. Improved slightly with outpatient steroids. Sounds like could possibly be esophageal spasm but overall unclear cause. No stridor or significant dysphagia.      Chronic obstructive pulmonary disease  Evidence of emphysema on chest CT. Admit VBG normal as above. No wheezing noted on exam. Managed prior to admission with Spiriva and prn albuterol.  - Continue Spiriva and albuterol     Essential hypertension with goal blood pressure less than 140/90  Managed prior to admission with amlodipine 10 mg daily and metoprolol XL 50 mg bid. Of note, recently on lisinopril but stopped early December 2020 due to concerns  regarding angioedema.  - Continue amlodipine and metoprolol      Cerebral aneurysm, non ruptured  History of non-ruptured aneurysm, s/p coil in 2014. Follows with neurosurgery Dr. García. Managed prior to admission with aspirin 325.  - Continue aspirin      Hypothyroidism  Managed prior to admission with levothyroxine 50 mcg daily, continue.     Chronic bilateral low back pain without sciatica  S/P lumbar spinal fusion  Chronic back pain managed prior to admission with Percocet 2 pills q 6 hours prn and prn flexeril 10 mg bid.     Hyperlipidemia   Managed prior to admission with Lipitor 20 mg daily, continue.     Major depressive disorder, recurrent episode, moderate  Anxiety  Stable mood on admission. Managed prior to admission with Effexor 225 mg daily, prn Seroquel 25 mg daily, and prn Valium 10 mg q 6 hour.   - Continue scheduled Effexor     Insomnia  Managed prior to admission with Trazodone 150 mg q hs prn, continue.             Procedures:   No procedures performed during this admission         Allergies:      Allergies   Allergen Reactions     Bee Venom      Lisinopril Swelling     Oral swelling             Medications Prior to Admission:     Medications Prior to Admission   Medication Sig Dispense Refill Last Dose     amLODIPine (NORVASC) 10 MG tablet Take 1 tablet (10 mg) by mouth daily 90 tablet 3 12/22/2020 at am     aspirin 325 MG tablet Take 1 tablet (325 mg) by mouth daily 180 tablet 6 12/22/2020 at a\m     atorvastatin (LIPITOR) 20 MG tablet Take 1 tablet (20 mg) by mouth daily 90 tablet 3 12/22/2020 at am     calcium carbonate 600 mg-vitamin D 400 units (CALTRATE) 600-400 MG-UNIT per tablet Take 1 tablet by mouth every evening   12/21/2020 at pm     cyclobenzaprine (FLEXERIL) 10 MG tablet Take 1 tablet (10 mg) by mouth 2 times daily as needed for muscle spasms 180 tablet 1 Past Week at Unknown time     diazepam (VALIUM) 10 MG tablet Take 10 mg by mouth every 6 hours as needed    Past Month at  Unknown time     ferrous sulfate (FEROSUL) 325 (65 Fe) MG tablet Take 325 mg by mouth every other day Alternating days with Senna tab   Past Month at held due to constipation issues     levothyroxine (LEVOTHROID) 50 MCG tablet Take 1 tablet (50 mcg) by mouth daily 90 tablet 3 12/22/2020 at am     metoprolol succinate ER (TOPROL-XL) 50 MG 24 hr tablet Take 1 tablet (50 mg) by mouth 2 times daily 180 tablet 1 12/22/2020 at am     multivitamin w/minerals (THERA-VIT-M) tablet Take 1 tablet by mouth every evening   12/21/2020 at pm     oxyCODONE-acetaminophen (PERCOCET)  MG per tablet TAKE TWO TABLETS BY MOUTH EVERY 6 HOURS AS NEEDED FOR MODERATE TO SEVERE PAIN ( DO NOT TAKE MORE THAN 4 TABLETS PER DAY) 120 tablet 0 12/21/2020 at Unknown time     QUEtiapine (SEROQUEL) 25 MG tablet TAKE ONE TABLET BY MOUTH DAILY AS NEEDED FOR ANXIETY (Patient taking differently: Take 25 mg by mouth daily as needed (anxiety) TAKE ONE TABLET BY MOUTH DAILY AS NEEDED FOR ANXIETY) 90 tablet 3 Past Week at Unknown time     senna (SENOKOT) 8.6 MG tablet Take 1 tablet by mouth every other day Alternating days with Iron tab   Past Week at Unknown time     tiotropium (SPIRIVA) 18 MCG inhaled capsule Inhale 1 capsule (18 mcg) into the lungs daily 30 capsule 11 12/22/2020 at am     traZODone (DESYREL) 150 MG tablet Take 1 tablet (150 mg) by mouth nightly as needed for sleep 90 tablet 3 12/21/2020 at pm     venlafaxine (EFFEXOR-XR) 150 MG 24 hr capsule TAKE ONE CAPSULE BY MOUTH ONCE DAILY to take with a 75 mg for total of 225 mg daily (Patient taking differently: Take 150 mg by mouth daily TAKE ONE CAPSULE BY MOUTH ONCE DAILY to take with a 75 mg for total of 225 mg daily) 90 capsule 1 12/22/2020 at am     venlafaxine (EFFEXOR-XR) 75 MG 24 hr capsule Take 1 capsule (75 mg) by mouth daily Take with 150 mg for a total of 225 mg daily (Patient taking differently: Take 75 mg by mouth daily Take with 150 mg for a total of 225 mg daily. Is taking the  75 mg some times 2 times a day, thought she was told to increased not sure) 90 capsule 1 12/22/2020 at am     VENTOLIN  (90 Base) MCG/ACT inhaler INHALE TWO PUFFS BY MOUTH EVERY 6 HOURS (Patient taking differently: Inhale 2 puffs into the lungs every 6 hours as needed ) 18 g 0 12/21/2020 at Unknown time     EPINEPHrine (EPIPEN) 0.3 MG/0.3ML injection Inject 0.3 mLs (0.3 mg) into the muscle once as needed for anaphylaxis 2 each 3 never used     naloxone (NARCAN) 4 MG/0.1ML nasal spray Spray 1 spray (4 mg) into one nostril alternating nostrils as needed for opioid reversal every 2-3 minutes until assistance arrives 0.2 mL 1 never used             Discharge Medications:     Current Discharge Medication List      START taking these medications    Details   levofloxacin (LEVAQUIN) 500 MG tablet Take 1 tablet (500 mg) by mouth daily for 7 days  Qty: 7 tablet, Refills: 0    Associated Diagnoses: Chronic obstructive pulmonary disease, unspecified COPD type (H)      predniSONE (DELTASONE) 20 MG tablet Take 2 tablets (40 mg) by mouth daily for 3 days  Qty: 5 tablet, Refills: 0    Associated Diagnoses: Chronic obstructive pulmonary disease, unspecified COPD type (H)         CONTINUE these medications which have NOT CHANGED    Details   amLODIPine (NORVASC) 10 MG tablet Take 1 tablet (10 mg) by mouth daily  Qty: 90 tablet, Refills: 3    Comments: HOLD ON FILE until patient requests or is due for refill.  Associated Diagnoses: Essential hypertension with goal blood pressure less than 140/90      aspirin 325 MG tablet Take 1 tablet (325 mg) by mouth daily  Qty: 180 tablet, Refills: 6    Associated Diagnoses: Cerebral aneurysm, nonruptured      atorvastatin (LIPITOR) 20 MG tablet Take 1 tablet (20 mg) by mouth daily  Qty: 90 tablet, Refills: 3    Associated Diagnoses: Hyperlipidemia LDL goal <100      calcium carbonate 600 mg-vitamin D 400 units (CALTRATE) 600-400 MG-UNIT per tablet Take 1 tablet by mouth every evening       cyclobenzaprine (FLEXERIL) 10 MG tablet Take 1 tablet (10 mg) by mouth 2 times daily as needed for muscle spasms  Qty: 180 tablet, Refills: 1    Associated Diagnoses: S/P lumbar spinal fusion      diazepam (VALIUM) 10 MG tablet Take 10 mg by mouth every 6 hours as needed       ferrous sulfate (FEROSUL) 325 (65 Fe) MG tablet Take 325 mg by mouth every other day Alternating days with Senna tab      levothyroxine (LEVOTHROID) 50 MCG tablet Take 1 tablet (50 mcg) by mouth daily  Qty: 90 tablet, Refills: 3    Associated Diagnoses: Hypothyroidism, unspecified type      metoprolol succinate ER (TOPROL-XL) 50 MG 24 hr tablet Take 1 tablet (50 mg) by mouth 2 times daily  Qty: 180 tablet, Refills: 1    Comments: HOLD ON FILE until patient requests or is due for refill.  Associated Diagnoses: Essential hypertension with goal blood pressure less than 140/90      multivitamin w/minerals (THERA-VIT-M) tablet Take 1 tablet by mouth every evening      oxyCODONE-acetaminophen (PERCOCET)  MG per tablet TAKE TWO TABLETS BY MOUTH EVERY 6 HOURS AS NEEDED FOR MODERATE TO SEVERE PAIN ( DO NOT TAKE MORE THAN 4 TABLETS PER DAY)  Qty: 120 tablet, Refills: 0    Associated Diagnoses: Chronic bilateral low back pain without sciatica; Sacroiliac joint pain; S/P lumbar spinal fusion      QUEtiapine (SEROQUEL) 25 MG tablet TAKE ONE TABLET BY MOUTH DAILY AS NEEDED FOR ANXIETY  Qty: 90 tablet, Refills: 3    Associated Diagnoses: Anxiety      senna (SENOKOT) 8.6 MG tablet Take 1 tablet by mouth every other day Alternating days with Iron tab      tiotropium (SPIRIVA) 18 MCG inhaled capsule Inhale 1 capsule (18 mcg) into the lungs daily  Qty: 30 capsule, Refills: 11    Associated Diagnoses: Chronic obstructive pulmonary disease, unspecified COPD type (H)      traZODone (DESYREL) 150 MG tablet Take 1 tablet (150 mg) by mouth nightly as needed for sleep  Qty: 90 tablet, Refills: 3    Associated Diagnoses: Insomnia, unspecified type      !!  "venlafaxine (EFFEXOR-XR) 150 MG 24 hr capsule TAKE ONE CAPSULE BY MOUTH ONCE DAILY to take with a 75 mg for total of 225 mg daily  Qty: 90 capsule, Refills: 1    Associated Diagnoses: Anxiety; Major depressive disorder, recurrent episode, moderate (H)      !! venlafaxine (EFFEXOR-XR) 75 MG 24 hr capsule Take 1 capsule (75 mg) by mouth daily Take with 150 mg for a total of 225 mg daily  Qty: 90 capsule, Refills: 1    Associated Diagnoses: Anxiety; Major depressive disorder, recurrent episode, moderate (H)      VENTOLIN  (90 Base) MCG/ACT inhaler INHALE TWO PUFFS BY MOUTH EVERY 6 HOURS  Qty: 18 g, Refills: 0    Associated Diagnoses: Centrilobular emphysema (H)      EPINEPHrine (EPIPEN) 0.3 MG/0.3ML injection Inject 0.3 mLs (0.3 mg) into the muscle once as needed for anaphylaxis  Qty: 2 each, Refills: 3    Associated Diagnoses: Allergy to bee sting      naloxone (NARCAN) 4 MG/0.1ML nasal spray Spray 1 spray (4 mg) into one nostril alternating nostrils as needed for opioid reversal every 2-3 minutes until assistance arrives  Qty: 0.2 mL, Refills: 1    Associated Diagnoses: Chronic bilateral low back pain without sciatica; Chronic narcotic use       !! - Potential duplicate medications found. Please discuss with provider.                Consultations:   No consultations were requested during this admission            Discharge Exam:   Blood pressure (!) 159/99, pulse 79, temperature 97.7  F (36.5  C), temperature source Oral, resp. rate 18, height 1.6 m (5' 3\"), weight 74.5 kg (164 lb 3.9 oz), SpO2 92 %, not currently breastfeeding.  GENERAL APPEARANCE: healthy, alert and no distress  EYES: conjunctiva clear, eyes grossly normal  HENT: external ears and nose normal   NECK: supple, no masses or adenopathy  RESP: lungs -B diffuse ins/ exp crackles- no rales, rhonchi or wheezes  CV: regular rate and rhythm, normal S1 S2, no S3 or S4 and no murmur, click or rub   ABDOMEN: soft, nontender, no HSM or masses and bowel " sounds normal  MS: no clubbing, cyanosis; no edema  SKIN: clear without significant rashes or lesions  NEURO: Normal strength and tone, sensory exam grossly normal, mentation intact and speech normal    Unresulted Labs Ordered in the Past 30 Days of this Admission     Date and Time Order Name Status Description    12/23/2020 1527 Blood culture Preliminary     12/23/2020 1527 Blood culture Preliminary           No results found for this or any previous visit (from the past 24 hour(s)).         Pending Tests at Discharge:   None         Discharge Instructions and Follow-Up:     Discharge diet: Regular   Discharge activity: Activity as tolerated   Discharge follow-up: Follow up with primary care provider in 1-2 weeks           Discharge Disposition:     Discharged to home      Attestation:  I have reviewed today's vital signs, notes, medications, labs and imaging.    Time Spent on this Encounter   I, Selena Crespo MD, personally saw the patient today and spent greater than 30 minutes discharging this patient.    Selena Crespo MD        14-Jul-2023 20:21

## 2023-07-14 NOTE — ED PROVIDER NOTE - ATTENDING CONTRIBUTION TO CARE
Michell Fonseca MD attending physician patient is a 32-year-old woman who is 22 weeks pregnant.  She had an episode today where she was standing on a warm 90 degree day (although in the shade she says) when she felt a loss of vision and hearing.  She does not think she lost consciousness.  She was also cold and clammy at that time and had met 11:44 AM.  This is happened repetitively in the past and once more in this pregnancy.  She called her obstetrician and he said to have her come to the ED.    The patient states that this is like all her prior episodes denies abdominal pain fever shortness of breath.  L&D was called and saw her at the bedside found a good fetal heart rate.  Patient denies any vaginal bleeding.    Symptoms appear to be presyncopal/vasovagal as she has had in the past.  We will check an EKG give her fluid check electrolytes and reevaluate    Michell Fonseca MD attending physician.  I attest I performed a history and physical of the patient and discussed their management with the resident and or advanced care provider.  I reviewed the resident and/or ACP's note and agree with the documented findings and plan of care with the exception of my documented changes if any.  My medical decision making and observations are found above.

## 2023-07-14 NOTE — ED PROVIDER NOTE - CLINICAL SUMMARY MEDICAL DECISION MAKING FREE TEXT BOX
Michell Fonseca MD attending physician patient is a 32-year-old woman who is 22 weeks pregnant.  She had an episode today where she was standing on a warm 90 degree day (although in the shade she says) when she felt a loss of vision and hearing.  She does not think she lost consciousness.  She was also cold and clammy at that time and had met 11:44 AM.  This is happened repetitively in the past and once more in this pregnancy.  She called her obstetrician and he said to have her come to the ED.    The patient states that this is like all her prior episodes denies abdominal pain fever shortness of breath.  L&D was called and saw her at the bedside found a good fetal heart rate.  Patient denies any vaginal bleeding.    Symptoms appear to be presyncopal/vasovagal as she has had in the past.  We will check an EKG give her fluid check electrolytes and reevaluate This is a 33y/o F pt currently 22wks pregnant presenting with acute episode of vision and hearing loss, self-resolving within several minutes, hx of many similar episodes previously, second occurrence in current pregnancy.  Normal visual acuity and visual fields on examination.  Case discussed with [facility], plan to check ECG, CBC, CMP, give IV fluids, and reassess.  Likely discharge home pending results.    Michell Fonseca MD attending physician patient is a 32-year-old woman who is 22 weeks pregnant.  She had an episode today where she was standing on a warm 90 degree day (although in the shade she says) when she felt a loss of vision and hearing.  She does not think she lost consciousness.  She was also cold and clammy at that time and had met 11:44 AM.  This is happened repetitively in the past and once more in this pregnancy.  She called her obstetrician and he said to have her come to the ED.    The patient states that this is like all her prior episodes denies abdominal pain fever shortness of breath.  L&D was called and saw her at the bedside found a good fetal heart rate.  Patient denies any vaginal bleeding.    Symptoms appear to be presyncopal/vasovagal as she has had in the past.  We will check an EKG give her fluid check electrolytes and reevaluate This is a 33y/o F pt currently 22wks pregnant presenting with acute episode of vision and hearing loss, self-resolving within several minutes, hx of many similar episodes previously, second occurrence in current pregnancy.  Normal visual acuity and visual fields on examination.  Case discussed with [facility], plan to check ECG, CBC, CMP, give IV fluids, and reassess.  Likely discharge home pending results.    Michell Fonseca MD attending physician patient is a 32-year-old woman who is 22 weeks pregnant.  She had an episode today where she was standing on a warm 90 degree day (although in the shade she says) when she felt a loss of vision and hearing.  She does not think she lost consciousness.  She was also cold and clammy at that time and had met 11:44 AM.  This is happened repetitively in the past and once more in this pregnancy.  She called her obstetrician and he said to have her come to the ED..    The patient states that this is like all her prior episodes denies abdominal pain fever shortness of breath.  L&D was called and saw her at the bedside found a good fetal heart rate.  Patient denies any vaginal bleeding.    Symptoms appear to be presyncopal/vasovagal as she has had in the past.  We will check an EKG give her fluid check electrolytes and reevaluate

## 2023-07-14 NOTE — ED ADULT NURSE REASSESSMENT NOTE - NS ED NURSE REASSESS COMMENT FT1
Report received from TR Banuelos. Pt at baseline mental status, breathing even and unlabored in bed. Pt denies chest pain, SOB, dizziness, headache, blurry vision, chills. Bed in lowest position, call bell within reach.

## 2023-07-14 NOTE — ED ADULT NURSE NOTE - NSFALLUNIVINTERV_ED_ALL_ED
Bed/Stretcher in lowest position, wheels locked, appropriate side rails in place/Call bell, personal items and telephone in reach/Instruct patient to call for assistance before getting out of bed/chair/stretcher/Non-slip footwear applied when patient is off stretcher/[place] to call system/Physically safe environment - no spills, clutter or unnecessary equipment/Purposeful proactive rounding/Room/bathroom lighting operational, light cord in reach

## 2023-07-14 NOTE — OB PERINATAL HUDDLE NOTE - NS_INDFORHUDDLE_OBGYN_ALL_OB_FT
22yo  @ 22w2d presenting with loss of vision/hearing associated with diaphoresis. No loss of consciousness or fall.

## 2023-07-15 VITALS
DIASTOLIC BLOOD PRESSURE: 67 MMHG | SYSTOLIC BLOOD PRESSURE: 101 MMHG | OXYGEN SATURATION: 100 % | RESPIRATION RATE: 17 BRPM | TEMPERATURE: 98 F | HEART RATE: 81 BPM

## 2023-07-15 NOTE — ED ADULT NURSE REASSESSMENT NOTE - GENERAL PATIENT STATE
Pt axox4, pt reports blurred vision and hearing loss resolved. vss/comfortable appearance/family/SO at bedside/smiling/interactive

## 2023-07-17 ENCOUNTER — APPOINTMENT (OUTPATIENT)
Dept: ANTEPARTUM | Facility: CLINIC | Age: 33
End: 2023-07-17
Payer: COMMERCIAL

## 2023-07-17 ENCOUNTER — ASOB RESULT (OUTPATIENT)
Age: 33
End: 2023-07-17

## 2023-07-17 LAB
CULTURE RESULTS: SIGNIFICANT CHANGE UP
SPECIMEN SOURCE: SIGNIFICANT CHANGE UP

## 2023-07-17 PROCEDURE — 76811 OB US DETAILED SNGL FETUS: CPT

## 2023-08-11 ENCOUNTER — APPOINTMENT (OUTPATIENT)
Dept: OBGYN | Facility: CLINIC | Age: 33
End: 2023-08-11
Payer: COMMERCIAL

## 2023-08-11 ENCOUNTER — NON-APPOINTMENT (OUTPATIENT)
Age: 33
End: 2023-08-11

## 2023-08-11 VITALS
SYSTOLIC BLOOD PRESSURE: 116 MMHG | WEIGHT: 157 LBS | HEIGHT: 63 IN | BODY MASS INDEX: 27.82 KG/M2 | DIASTOLIC BLOOD PRESSURE: 79 MMHG | HEART RATE: 105 BPM

## 2023-08-11 PROCEDURE — 0502F SUBSEQUENT PRENATAL CARE: CPT

## 2023-08-12 LAB
GLUCOSE 1H P 100 G GLC PO SERPL-MCNC: 125 MG/DL
T PALLIDUM AB SER QL IA: NEGATIVE

## 2023-08-15 NOTE — OB NEONATOLOGY/PEDIATRICIAN DELIVERY SUMMARY - NS_FETALCONCERNS_OBGYN_ALL_OB_FT
Detail Level: Detailed Number Of Curettages: 2 Size Of Lesion In Cm: 1 Size Of Lesion After Curettage: 1.4 Add Intralesional Injection: No Concentration (Mg/Ml Or Millions Of Plaque Forming Units/Cc): 0.01 Anesthesia Type: 2% lidocaine with epinephrine Anesthesia Volume In Cc: 0.6 Cautery Type: electrodesiccation What Was Performed First?: Curettage Final Size Statement: The size of the lesion after curettage was Additional Information: (Optional): The wound was cleaned, and a pressure dressing was applied.  The patient received detailed post-op instructions. Consent was obtained from the patient. The risks, benefits and alternatives to therapy were discussed in detail. Specifically, the risks of infection, scarring, bleeding, prolonged wound healing, nerve injury, incomplete removal, allergy to anesthesia and recurrence were addressed. Alternatives to ED&C, such as: surgical removal and XRT were also discussed.  Prior to the procedure, the treatment site was clearly identified and confirmed by the patient. All components of Universal Protocol/PAUSE Rule completed. Post-Care Instructions: I reviewed with the patient in detail post-care instructions. Patient is to keep the area dry for 48 hours, and not to engage in any swimming until the area is healed. Should the patient develop any fevers, chills, bleeding, severe pain patient will contact the office immediately. Bill As A Line Item Or As Units: Line Item Denies

## 2023-08-21 ENCOUNTER — APPOINTMENT (OUTPATIENT)
Dept: INTERNAL MEDICINE | Facility: CLINIC | Age: 33
End: 2023-08-21

## 2023-09-01 ENCOUNTER — APPOINTMENT (OUTPATIENT)
Dept: OBGYN | Facility: CLINIC | Age: 33
End: 2023-09-01
Payer: COMMERCIAL

## 2023-09-01 ENCOUNTER — NON-APPOINTMENT (OUTPATIENT)
Age: 33
End: 2023-09-01

## 2023-09-01 VITALS
DIASTOLIC BLOOD PRESSURE: 77 MMHG | SYSTOLIC BLOOD PRESSURE: 110 MMHG | HEART RATE: 98 BPM | HEIGHT: 63 IN | BODY MASS INDEX: 28.53 KG/M2 | WEIGHT: 161 LBS

## 2023-09-01 PROCEDURE — 0502F SUBSEQUENT PRENATAL CARE: CPT

## 2023-09-01 PROCEDURE — 90715 TDAP VACCINE 7 YRS/> IM: CPT

## 2023-09-01 PROCEDURE — 90471 IMMUNIZATION ADMIN: CPT

## 2023-09-18 ENCOUNTER — NON-APPOINTMENT (OUTPATIENT)
Age: 33
End: 2023-09-18

## 2023-09-18 ENCOUNTER — APPOINTMENT (OUTPATIENT)
Dept: OBGYN | Facility: CLINIC | Age: 33
End: 2023-09-18
Payer: COMMERCIAL

## 2023-09-18 VITALS
SYSTOLIC BLOOD PRESSURE: 110 MMHG | HEIGHT: 63 IN | BODY MASS INDEX: 29.41 KG/M2 | HEART RATE: 80 BPM | WEIGHT: 166 LBS | DIASTOLIC BLOOD PRESSURE: 73 MMHG

## 2023-09-18 PROCEDURE — 0502F SUBSEQUENT PRENATAL CARE: CPT

## 2023-09-25 ENCOUNTER — APPOINTMENT (OUTPATIENT)
Dept: ANTEPARTUM | Facility: CLINIC | Age: 33
End: 2023-09-25

## 2023-10-02 NOTE — OB RN PATIENT PROFILE - MENTAL HEALTH CONDITIONS/SYMPTOMS, PROFILE
What Type Of Note Output Would You Prefer (Optional)?: Standard Output
What Is The Reason For Today's Visit?: Full Body Skin Examination
none

## 2023-10-13 ENCOUNTER — NON-APPOINTMENT (OUTPATIENT)
Age: 33
End: 2023-10-13

## 2023-10-13 ENCOUNTER — APPOINTMENT (OUTPATIENT)
Dept: OBGYN | Facility: CLINIC | Age: 33
End: 2023-10-13
Payer: COMMERCIAL

## 2023-10-13 VITALS
DIASTOLIC BLOOD PRESSURE: 75 MMHG | HEIGHT: 63 IN | SYSTOLIC BLOOD PRESSURE: 112 MMHG | WEIGHT: 170 LBS | HEART RATE: 91 BPM | BODY MASS INDEX: 30.12 KG/M2

## 2023-10-13 DIAGNOSIS — N89.8 OTHER SPECIFIED NONINFLAMMATORY DISORDERS OF VAGINA: ICD-10-CM

## 2023-10-13 PROCEDURE — 0502F SUBSEQUENT PRENATAL CARE: CPT

## 2023-10-16 LAB
CANDIDA VAG CYTO: DETECTED
G VAGINALIS+PREV SP MTYP VAG QL MICRO: DETECTED
GP B STREP DNA SPEC QL NAA+PROBE: NOT DETECTED
HIV1+2 AB SPEC QL IA.RAPID: NONREACTIVE
SOURCE GBS: NORMAL
T VAGINALIS VAG QL WET PREP: NOT DETECTED

## 2023-10-24 ENCOUNTER — APPOINTMENT (OUTPATIENT)
Dept: ANTEPARTUM | Facility: CLINIC | Age: 33
End: 2023-10-24
Payer: COMMERCIAL

## 2023-10-24 ENCOUNTER — ASOB RESULT (OUTPATIENT)
Age: 33
End: 2023-10-24

## 2023-10-24 PROCEDURE — 76819 FETAL BIOPHYS PROFIL W/O NST: CPT | Mod: 59

## 2023-10-24 PROCEDURE — 76816 OB US FOLLOW-UP PER FETUS: CPT

## 2023-10-30 ENCOUNTER — APPOINTMENT (OUTPATIENT)
Dept: OBGYN | Facility: CLINIC | Age: 33
End: 2023-10-30
Payer: COMMERCIAL

## 2023-10-30 VITALS
HEIGHT: 63 IN | DIASTOLIC BLOOD PRESSURE: 71 MMHG | BODY MASS INDEX: 30.65 KG/M2 | HEART RATE: 81 BPM | WEIGHT: 173 LBS | SYSTOLIC BLOOD PRESSURE: 104 MMHG

## 2023-10-30 DIAGNOSIS — N76.0 ACUTE VAGINITIS: ICD-10-CM

## 2023-10-30 PROCEDURE — 99213 OFFICE O/P EST LOW 20 MIN: CPT

## 2023-11-01 LAB
CANDIDA VAG CYTO: NOT DETECTED
G VAGINALIS+PREV SP MTYP VAG QL MICRO: NOT DETECTED
T VAGINALIS VAG QL WET PREP: NOT DETECTED

## 2023-11-07 ENCOUNTER — APPOINTMENT (OUTPATIENT)
Dept: OBGYN | Facility: CLINIC | Age: 33
End: 2023-11-07
Payer: COMMERCIAL

## 2023-11-07 ENCOUNTER — NON-APPOINTMENT (OUTPATIENT)
Age: 33
End: 2023-11-07

## 2023-11-07 VITALS
BODY MASS INDEX: 31.18 KG/M2 | DIASTOLIC BLOOD PRESSURE: 64 MMHG | HEART RATE: 86 BPM | SYSTOLIC BLOOD PRESSURE: 119 MMHG | HEIGHT: 63 IN | WEIGHT: 176 LBS

## 2023-11-07 DIAGNOSIS — Z87.59 PERSONAL HISTORY OF OTHER COMPLICATIONS OF PREGNANCY, CHILDBIRTH AND THE PUERPERIUM: ICD-10-CM

## 2023-11-07 PROCEDURE — 0502F SUBSEQUENT PRENATAL CARE: CPT

## 2023-11-14 ENCOUNTER — APPOINTMENT (OUTPATIENT)
Dept: OBGYN | Facility: CLINIC | Age: 33
End: 2023-11-14
Payer: COMMERCIAL

## 2023-11-14 ENCOUNTER — INPATIENT (INPATIENT)
Facility: HOSPITAL | Age: 33
LOS: 0 days | Discharge: HOME | End: 2023-11-15
Attending: STUDENT IN AN ORGANIZED HEALTH CARE EDUCATION/TRAINING PROGRAM | Admitting: STUDENT IN AN ORGANIZED HEALTH CARE EDUCATION/TRAINING PROGRAM
Payer: COMMERCIAL

## 2023-11-14 ENCOUNTER — TRANSCRIPTION ENCOUNTER (OUTPATIENT)
Age: 33
End: 2023-11-14

## 2023-11-14 ENCOUNTER — APPOINTMENT (OUTPATIENT)
Dept: ANTEPARTUM | Facility: CLINIC | Age: 33
End: 2023-11-14

## 2023-11-14 VITALS
DIASTOLIC BLOOD PRESSURE: 83 MMHG | HEART RATE: 105 BPM | SYSTOLIC BLOOD PRESSURE: 129 MMHG | TEMPERATURE: 98 F | RESPIRATION RATE: 16 BRPM

## 2023-11-14 VITALS
WEIGHT: 176 LBS | HEIGHT: 63 IN | DIASTOLIC BLOOD PRESSURE: 79 MMHG | SYSTOLIC BLOOD PRESSURE: 120 MMHG | HEART RATE: 94 BPM | BODY MASS INDEX: 31.18 KG/M2

## 2023-11-14 DIAGNOSIS — Z98.890 OTHER SPECIFIED POSTPROCEDURAL STATES: Chronic | ICD-10-CM

## 2023-11-14 DIAGNOSIS — O26.899 OTHER SPECIFIED PREGNANCY RELATED CONDITIONS, UNSPECIFIED TRIMESTER: ICD-10-CM

## 2023-11-14 LAB
ALBUMIN SERPL ELPH-MCNC: 3.3 G/DL — SIGNIFICANT CHANGE UP (ref 3.3–5)
ALBUMIN SERPL ELPH-MCNC: 3.3 G/DL — SIGNIFICANT CHANGE UP (ref 3.3–5)
ALP SERPL-CCNC: 135 U/L — HIGH (ref 40–120)
ALP SERPL-CCNC: 135 U/L — HIGH (ref 40–120)
ALT FLD-CCNC: 9 U/L — SIGNIFICANT CHANGE UP (ref 4–33)
ALT FLD-CCNC: 9 U/L — SIGNIFICANT CHANGE UP (ref 4–33)
ANION GAP SERPL CALC-SCNC: 10 MMOL/L — SIGNIFICANT CHANGE UP (ref 7–14)
ANION GAP SERPL CALC-SCNC: 10 MMOL/L — SIGNIFICANT CHANGE UP (ref 7–14)
APTT BLD: 29.5 SEC — SIGNIFICANT CHANGE UP (ref 24.5–35.6)
APTT BLD: 29.5 SEC — SIGNIFICANT CHANGE UP (ref 24.5–35.6)
AST SERPL-CCNC: 20 U/L — SIGNIFICANT CHANGE UP (ref 4–32)
AST SERPL-CCNC: 20 U/L — SIGNIFICANT CHANGE UP (ref 4–32)
BASOPHILS # BLD AUTO: 0.05 K/UL — SIGNIFICANT CHANGE UP (ref 0–0.2)
BASOPHILS # BLD AUTO: 0.05 K/UL — SIGNIFICANT CHANGE UP (ref 0–0.2)
BASOPHILS # BLD AUTO: 0.06 K/UL — SIGNIFICANT CHANGE UP (ref 0–0.2)
BASOPHILS # BLD AUTO: 0.06 K/UL — SIGNIFICANT CHANGE UP (ref 0–0.2)
BASOPHILS NFR BLD AUTO: 0.3 % — SIGNIFICANT CHANGE UP (ref 0–2)
BASOPHILS NFR BLD AUTO: 0.3 % — SIGNIFICANT CHANGE UP (ref 0–2)
BASOPHILS NFR BLD AUTO: 0.5 % — SIGNIFICANT CHANGE UP (ref 0–2)
BASOPHILS NFR BLD AUTO: 0.5 % — SIGNIFICANT CHANGE UP (ref 0–2)
BILIRUB SERPL-MCNC: <0.2 MG/DL — SIGNIFICANT CHANGE UP (ref 0.2–1.2)
BILIRUB SERPL-MCNC: <0.2 MG/DL — SIGNIFICANT CHANGE UP (ref 0.2–1.2)
BLD GP AB SCN SERPL QL: NEGATIVE — SIGNIFICANT CHANGE UP
BLD GP AB SCN SERPL QL: NEGATIVE — SIGNIFICANT CHANGE UP
BUN SERPL-MCNC: 11 MG/DL — SIGNIFICANT CHANGE UP (ref 7–23)
BUN SERPL-MCNC: 11 MG/DL — SIGNIFICANT CHANGE UP (ref 7–23)
CALCIUM SERPL-MCNC: 9.3 MG/DL — SIGNIFICANT CHANGE UP (ref 8.4–10.5)
CALCIUM SERPL-MCNC: 9.3 MG/DL — SIGNIFICANT CHANGE UP (ref 8.4–10.5)
CHLORIDE SERPL-SCNC: 104 MMOL/L — SIGNIFICANT CHANGE UP (ref 98–107)
CHLORIDE SERPL-SCNC: 104 MMOL/L — SIGNIFICANT CHANGE UP (ref 98–107)
CO2 SERPL-SCNC: 23 MMOL/L — SIGNIFICANT CHANGE UP (ref 22–31)
CO2 SERPL-SCNC: 23 MMOL/L — SIGNIFICANT CHANGE UP (ref 22–31)
CREAT SERPL-MCNC: 0.62 MG/DL — SIGNIFICANT CHANGE UP (ref 0.5–1.3)
CREAT SERPL-MCNC: 0.62 MG/DL — SIGNIFICANT CHANGE UP (ref 0.5–1.3)
EGFR: 120 ML/MIN/1.73M2 — SIGNIFICANT CHANGE UP
EGFR: 120 ML/MIN/1.73M2 — SIGNIFICANT CHANGE UP
EOSINOPHIL # BLD AUTO: 0.01 K/UL — SIGNIFICANT CHANGE UP (ref 0–0.5)
EOSINOPHIL # BLD AUTO: 0.01 K/UL — SIGNIFICANT CHANGE UP (ref 0–0.5)
EOSINOPHIL # BLD AUTO: 0.05 K/UL — SIGNIFICANT CHANGE UP (ref 0–0.5)
EOSINOPHIL # BLD AUTO: 0.05 K/UL — SIGNIFICANT CHANGE UP (ref 0–0.5)
EOSINOPHIL NFR BLD AUTO: 0.1 % — SIGNIFICANT CHANGE UP (ref 0–6)
EOSINOPHIL NFR BLD AUTO: 0.1 % — SIGNIFICANT CHANGE UP (ref 0–6)
EOSINOPHIL NFR BLD AUTO: 0.4 % — SIGNIFICANT CHANGE UP (ref 0–6)
EOSINOPHIL NFR BLD AUTO: 0.4 % — SIGNIFICANT CHANGE UP (ref 0–6)
FIBRINOGEN PPP-MCNC: 603 MG/DL — HIGH (ref 200–465)
FIBRINOGEN PPP-MCNC: 603 MG/DL — HIGH (ref 200–465)
GLUCOSE SERPL-MCNC: 96 MG/DL — SIGNIFICANT CHANGE UP (ref 70–99)
GLUCOSE SERPL-MCNC: 96 MG/DL — SIGNIFICANT CHANGE UP (ref 70–99)
HCT VFR BLD CALC: 42.4 % — SIGNIFICANT CHANGE UP (ref 34.5–45)
HCT VFR BLD CALC: 42.4 % — SIGNIFICANT CHANGE UP (ref 34.5–45)
HCT VFR BLD CALC: 44.5 % — SIGNIFICANT CHANGE UP (ref 34.5–45)
HCT VFR BLD CALC: 44.5 % — SIGNIFICANT CHANGE UP (ref 34.5–45)
HGB BLD-MCNC: 14.1 G/DL — SIGNIFICANT CHANGE UP (ref 11.5–15.5)
HGB BLD-MCNC: 14.1 G/DL — SIGNIFICANT CHANGE UP (ref 11.5–15.5)
HGB BLD-MCNC: 14.8 G/DL — SIGNIFICANT CHANGE UP (ref 11.5–15.5)
HGB BLD-MCNC: 14.8 G/DL — SIGNIFICANT CHANGE UP (ref 11.5–15.5)
IANC: 14.91 K/UL — HIGH (ref 1.8–7.4)
IANC: 14.91 K/UL — HIGH (ref 1.8–7.4)
IANC: 9.18 K/UL — HIGH (ref 1.8–7.4)
IANC: 9.18 K/UL — HIGH (ref 1.8–7.4)
IMM GRANULOCYTES NFR BLD AUTO: 1.5 % — HIGH (ref 0–0.9)
IMM GRANULOCYTES NFR BLD AUTO: 1.5 % — HIGH (ref 0–0.9)
IMM GRANULOCYTES NFR BLD AUTO: 3 % — HIGH (ref 0–0.9)
IMM GRANULOCYTES NFR BLD AUTO: 3 % — HIGH (ref 0–0.9)
INR BLD: <0.9 RATIO — SIGNIFICANT CHANGE UP (ref 0.85–1.18)
INR BLD: <0.9 RATIO — SIGNIFICANT CHANGE UP (ref 0.85–1.18)
LDH SERPL L TO P-CCNC: 188 U/L — SIGNIFICANT CHANGE UP (ref 135–225)
LDH SERPL L TO P-CCNC: 188 U/L — SIGNIFICANT CHANGE UP (ref 135–225)
LYMPHOCYTES # BLD AUTO: 1.36 K/UL — SIGNIFICANT CHANGE UP (ref 1–3.3)
LYMPHOCYTES # BLD AUTO: 1.36 K/UL — SIGNIFICANT CHANGE UP (ref 1–3.3)
LYMPHOCYTES # BLD AUTO: 1.66 K/UL — SIGNIFICANT CHANGE UP (ref 1–3.3)
LYMPHOCYTES # BLD AUTO: 1.66 K/UL — SIGNIFICANT CHANGE UP (ref 1–3.3)
LYMPHOCYTES # BLD AUTO: 13.5 % — SIGNIFICANT CHANGE UP (ref 13–44)
LYMPHOCYTES # BLD AUTO: 13.5 % — SIGNIFICANT CHANGE UP (ref 13–44)
LYMPHOCYTES # BLD AUTO: 7.8 % — LOW (ref 13–44)
LYMPHOCYTES # BLD AUTO: 7.8 % — LOW (ref 13–44)
MCHC RBC-ENTMCNC: 30.1 PG — SIGNIFICANT CHANGE UP (ref 27–34)
MCHC RBC-ENTMCNC: 30.1 PG — SIGNIFICANT CHANGE UP (ref 27–34)
MCHC RBC-ENTMCNC: 30.4 PG — SIGNIFICANT CHANGE UP (ref 27–34)
MCHC RBC-ENTMCNC: 30.4 PG — SIGNIFICANT CHANGE UP (ref 27–34)
MCHC RBC-ENTMCNC: 33.3 GM/DL — SIGNIFICANT CHANGE UP (ref 32–36)
MCV RBC AUTO: 90.6 FL — SIGNIFICANT CHANGE UP (ref 80–100)
MCV RBC AUTO: 90.6 FL — SIGNIFICANT CHANGE UP (ref 80–100)
MCV RBC AUTO: 91.4 FL — SIGNIFICANT CHANGE UP (ref 80–100)
MCV RBC AUTO: 91.4 FL — SIGNIFICANT CHANGE UP (ref 80–100)
MONOCYTES # BLD AUTO: 0.94 K/UL — HIGH (ref 0–0.9)
MONOCYTES # BLD AUTO: 0.94 K/UL — HIGH (ref 0–0.9)
MONOCYTES # BLD AUTO: 0.96 K/UL — HIGH (ref 0–0.9)
MONOCYTES # BLD AUTO: 0.96 K/UL — HIGH (ref 0–0.9)
MONOCYTES NFR BLD AUTO: 5.4 % — SIGNIFICANT CHANGE UP (ref 2–14)
MONOCYTES NFR BLD AUTO: 5.4 % — SIGNIFICANT CHANGE UP (ref 2–14)
MONOCYTES NFR BLD AUTO: 7.8 % — SIGNIFICANT CHANGE UP (ref 2–14)
MONOCYTES NFR BLD AUTO: 7.8 % — SIGNIFICANT CHANGE UP (ref 2–14)
NEUTROPHILS # BLD AUTO: 14.91 K/UL — HIGH (ref 1.8–7.4)
NEUTROPHILS # BLD AUTO: 14.91 K/UL — HIGH (ref 1.8–7.4)
NEUTROPHILS # BLD AUTO: 9.18 K/UL — HIGH (ref 1.8–7.4)
NEUTROPHILS # BLD AUTO: 9.18 K/UL — HIGH (ref 1.8–7.4)
NEUTROPHILS NFR BLD AUTO: 74.8 % — SIGNIFICANT CHANGE UP (ref 43–77)
NEUTROPHILS NFR BLD AUTO: 74.8 % — SIGNIFICANT CHANGE UP (ref 43–77)
NEUTROPHILS NFR BLD AUTO: 84.9 % — HIGH (ref 43–77)
NEUTROPHILS NFR BLD AUTO: 84.9 % — HIGH (ref 43–77)
NRBC # BLD: 0 /100 WBCS — SIGNIFICANT CHANGE UP (ref 0–0)
NRBC # FLD: 0 K/UL — SIGNIFICANT CHANGE UP (ref 0–0)
PLATELET # BLD AUTO: 253 K/UL — SIGNIFICANT CHANGE UP (ref 150–400)
PLATELET # BLD AUTO: 253 K/UL — SIGNIFICANT CHANGE UP (ref 150–400)
PLATELET # BLD AUTO: 320 K/UL — SIGNIFICANT CHANGE UP (ref 150–400)
PLATELET # BLD AUTO: 320 K/UL — SIGNIFICANT CHANGE UP (ref 150–400)
POTASSIUM SERPL-MCNC: 4.5 MMOL/L — SIGNIFICANT CHANGE UP (ref 3.5–5.3)
POTASSIUM SERPL-MCNC: 4.5 MMOL/L — SIGNIFICANT CHANGE UP (ref 3.5–5.3)
POTASSIUM SERPL-SCNC: 4.5 MMOL/L — SIGNIFICANT CHANGE UP (ref 3.5–5.3)
POTASSIUM SERPL-SCNC: 4.5 MMOL/L — SIGNIFICANT CHANGE UP (ref 3.5–5.3)
PROT SERPL-MCNC: 6.2 G/DL — SIGNIFICANT CHANGE UP (ref 6–8.3)
PROT SERPL-MCNC: 6.2 G/DL — SIGNIFICANT CHANGE UP (ref 6–8.3)
PROTHROM AB SERPL-ACNC: 10 SEC — SIGNIFICANT CHANGE UP (ref 9.5–13)
PROTHROM AB SERPL-ACNC: 10 SEC — SIGNIFICANT CHANGE UP (ref 9.5–13)
RBC # BLD: 4.64 M/UL — SIGNIFICANT CHANGE UP (ref 3.8–5.2)
RBC # BLD: 4.64 M/UL — SIGNIFICANT CHANGE UP (ref 3.8–5.2)
RBC # BLD: 4.91 M/UL — SIGNIFICANT CHANGE UP (ref 3.8–5.2)
RBC # BLD: 4.91 M/UL — SIGNIFICANT CHANGE UP (ref 3.8–5.2)
RBC # FLD: 13.2 % — SIGNIFICANT CHANGE UP (ref 10.3–14.5)
RH IG SCN BLD-IMP: POSITIVE — SIGNIFICANT CHANGE UP
RH IG SCN BLD-IMP: POSITIVE — SIGNIFICANT CHANGE UP
SODIUM SERPL-SCNC: 137 MMOL/L — SIGNIFICANT CHANGE UP (ref 135–145)
SODIUM SERPL-SCNC: 137 MMOL/L — SIGNIFICANT CHANGE UP (ref 135–145)
URATE SERPL-MCNC: 5.5 MG/DL — SIGNIFICANT CHANGE UP (ref 2.5–7)
URATE SERPL-MCNC: 5.5 MG/DL — SIGNIFICANT CHANGE UP (ref 2.5–7)
WBC # BLD: 12.28 K/UL — HIGH (ref 3.8–10.5)
WBC # BLD: 12.28 K/UL — HIGH (ref 3.8–10.5)
WBC # BLD: 17.53 K/UL — HIGH (ref 3.8–10.5)
WBC # BLD: 17.53 K/UL — HIGH (ref 3.8–10.5)
WBC # FLD AUTO: 12.28 K/UL — HIGH (ref 3.8–10.5)
WBC # FLD AUTO: 12.28 K/UL — HIGH (ref 3.8–10.5)
WBC # FLD AUTO: 17.53 K/UL — HIGH (ref 3.8–10.5)
WBC # FLD AUTO: 17.53 K/UL — HIGH (ref 3.8–10.5)

## 2023-11-14 PROCEDURE — 0502F SUBSEQUENT PRENATAL CARE: CPT

## 2023-11-14 PROCEDURE — 59410 OBSTETRICAL CARE: CPT

## 2023-11-14 RX ORDER — SIMETHICONE 80 MG/1
80 TABLET, CHEWABLE ORAL EVERY 4 HOURS
Refills: 0 | Status: DISCONTINUED | OUTPATIENT
Start: 2023-11-14 | End: 2023-11-15

## 2023-11-14 RX ORDER — CHLORHEXIDINE GLUCONATE 213 G/1000ML
1 SOLUTION TOPICAL DAILY
Refills: 0 | Status: DISCONTINUED | OUTPATIENT
Start: 2023-11-14 | End: 2023-11-14

## 2023-11-14 RX ORDER — HYDROCORTISONE 1 %
1 OINTMENT (GRAM) TOPICAL EVERY 6 HOURS
Refills: 0 | Status: DISCONTINUED | OUTPATIENT
Start: 2023-11-14 | End: 2023-11-15

## 2023-11-14 RX ORDER — KETOROLAC TROMETHAMINE 30 MG/ML
30 SYRINGE (ML) INJECTION ONCE
Refills: 0 | Status: DISCONTINUED | OUTPATIENT
Start: 2023-11-14 | End: 2023-11-14

## 2023-11-14 RX ORDER — ALBUTEROL 90 UG/1
2 AEROSOL, METERED ORAL
Refills: 0 | DISCHARGE

## 2023-11-14 RX ORDER — ACETAMINOPHEN 500 MG
975 TABLET ORAL
Refills: 0 | Status: DISCONTINUED | OUTPATIENT
Start: 2023-11-14 | End: 2023-11-15

## 2023-11-14 RX ORDER — PRAMOXINE HYDROCHLORIDE 150 MG/15G
1 AEROSOL, FOAM RECTAL EVERY 4 HOURS
Refills: 0 | Status: DISCONTINUED | OUTPATIENT
Start: 2023-11-14 | End: 2023-11-15

## 2023-11-14 RX ORDER — TETANUS TOXOID, REDUCED DIPHTHERIA TOXOID AND ACELLULAR PERTUSSIS VACCINE, ADSORBED 5; 2.5; 8; 8; 2.5 [IU]/.5ML; [IU]/.5ML; UG/.5ML; UG/.5ML; UG/.5ML
0.5 SUSPENSION INTRAMUSCULAR ONCE
Refills: 0 | Status: DISCONTINUED | OUTPATIENT
Start: 2023-11-14 | End: 2023-11-15

## 2023-11-14 RX ORDER — SODIUM CHLORIDE 9 MG/ML
3 INJECTION INTRAMUSCULAR; INTRAVENOUS; SUBCUTANEOUS EVERY 8 HOURS
Refills: 0 | Status: DISCONTINUED | OUTPATIENT
Start: 2023-11-14 | End: 2023-11-15

## 2023-11-14 RX ORDER — ACETAMINOPHEN 500 MG
2 TABLET ORAL
Qty: 0 | Refills: 0 | DISCHARGE

## 2023-11-14 RX ORDER — CITRIC ACID/SODIUM CITRATE 300-500 MG
15 SOLUTION, ORAL ORAL EVERY 6 HOURS
Refills: 0 | Status: DISCONTINUED | OUTPATIENT
Start: 2023-11-14 | End: 2023-11-14

## 2023-11-14 RX ORDER — IBUPROFEN 200 MG
600 TABLET ORAL EVERY 6 HOURS
Refills: 0 | Status: DISCONTINUED | OUTPATIENT
Start: 2023-11-14 | End: 2023-11-15

## 2023-11-14 RX ORDER — SODIUM CHLORIDE 9 MG/ML
1000 INJECTION, SOLUTION INTRAVENOUS
Refills: 0 | Status: DISCONTINUED | OUTPATIENT
Start: 2023-11-14 | End: 2023-11-14

## 2023-11-14 RX ORDER — OXYTOCIN 10 UNIT/ML
333.33 VIAL (ML) INJECTION
Qty: 20 | Refills: 0 | Status: DISCONTINUED | OUTPATIENT
Start: 2023-11-14 | End: 2023-11-15

## 2023-11-14 RX ORDER — BENZOCAINE 10 %
1 GEL (GRAM) MUCOUS MEMBRANE EVERY 6 HOURS
Refills: 0 | Status: DISCONTINUED | OUTPATIENT
Start: 2023-11-14 | End: 2023-11-15

## 2023-11-14 RX ORDER — OXYTOCIN 10 UNIT/ML
333.33 VIAL (ML) INJECTION
Qty: 20 | Refills: 0 | Status: DISCONTINUED | OUTPATIENT
Start: 2023-11-14 | End: 2023-11-14

## 2023-11-14 RX ORDER — AER TRAVELER 0.5 G/1
1 SOLUTION RECTAL; TOPICAL EVERY 4 HOURS
Refills: 0 | Status: DISCONTINUED | OUTPATIENT
Start: 2023-11-14 | End: 2023-11-15

## 2023-11-14 RX ORDER — LANOLIN
1 OINTMENT (GRAM) TOPICAL EVERY 6 HOURS
Refills: 0 | Status: DISCONTINUED | OUTPATIENT
Start: 2023-11-14 | End: 2023-11-15

## 2023-11-14 RX ORDER — DIPHENHYDRAMINE HCL 50 MG
25 CAPSULE ORAL EVERY 6 HOURS
Refills: 0 | Status: DISCONTINUED | OUTPATIENT
Start: 2023-11-14 | End: 2023-11-15

## 2023-11-14 RX ORDER — IBUPROFEN 200 MG
600 TABLET ORAL EVERY 6 HOURS
Refills: 0 | Status: COMPLETED | OUTPATIENT
Start: 2023-11-14 | End: 2024-10-12

## 2023-11-14 RX ORDER — ACETAMINOPHEN 500 MG
3 TABLET ORAL
Qty: 0 | Refills: 0 | DISCHARGE
Start: 2023-11-14

## 2023-11-14 RX ORDER — MAGNESIUM HYDROXIDE 400 MG/1
30 TABLET, CHEWABLE ORAL
Refills: 0 | Status: DISCONTINUED | OUTPATIENT
Start: 2023-11-14 | End: 2023-11-15

## 2023-11-14 RX ORDER — DIBUCAINE 1 %
1 OINTMENT (GRAM) RECTAL EVERY 6 HOURS
Refills: 0 | Status: DISCONTINUED | OUTPATIENT
Start: 2023-11-14 | End: 2023-11-15

## 2023-11-14 RX ORDER — OXYCODONE HYDROCHLORIDE 5 MG/1
5 TABLET ORAL ONCE
Refills: 0 | Status: DISCONTINUED | OUTPATIENT
Start: 2023-11-14 | End: 2023-11-15

## 2023-11-14 RX ORDER — IBUPROFEN 200 MG
1 TABLET ORAL
Qty: 0 | Refills: 0 | DISCHARGE
Start: 2023-11-14

## 2023-11-14 RX ORDER — IBUPROFEN 200 MG
1 TABLET ORAL
Qty: 0 | Refills: 0 | DISCHARGE

## 2023-11-14 RX ORDER — OXYCODONE HYDROCHLORIDE 5 MG/1
5 TABLET ORAL
Refills: 0 | Status: DISCONTINUED | OUTPATIENT
Start: 2023-11-14 | End: 2023-11-15

## 2023-11-14 RX ADMIN — SODIUM CHLORIDE 3 MILLILITER(S): 9 INJECTION INTRAMUSCULAR; INTRAVENOUS; SUBCUTANEOUS at 22:00

## 2023-11-14 RX ADMIN — Medication 1000 MILLIUNIT(S)/MIN: at 17:31

## 2023-11-14 RX ADMIN — Medication 1000 MILLIUNIT(S)/MIN: at 16:56

## 2023-11-14 RX ADMIN — CHLORHEXIDINE GLUCONATE 1 APPLICATION(S): 213 SOLUTION TOPICAL at 15:07

## 2023-11-14 RX ADMIN — Medication 30 MILLIGRAM(S): at 18:09

## 2023-11-14 RX ADMIN — Medication 975 MILLIGRAM(S): at 21:44

## 2023-11-14 RX ADMIN — SODIUM CHLORIDE 125 MILLILITER(S): 9 INJECTION, SOLUTION INTRAVENOUS at 13:49

## 2023-11-14 RX ADMIN — Medication 975 MILLIGRAM(S): at 22:44

## 2023-11-14 RX ADMIN — Medication 30 MILLIGRAM(S): at 17:47

## 2023-11-14 NOTE — OB PROVIDER TRIAGE NOTE - NS_OBGYNHISTORY_OBGYN_ALL_OB_FT
ectopic 1/2021 laparoscopy right  12/28/19 39 weeks vavd 6#7 f    Had syncopal episode 7/14/23: evaluated in ED: benign findings

## 2023-11-14 NOTE — OB PROVIDER TRIAGE NOTE - NSHPPHYSICALEXAM_GEN_ALL_CORE
T(C): 36.9 (11-14-23 @ 12:20), Max: 36.9 (11-14-23 @ 11:47)  HR: 93 (11-14-23 @ 13:38) (85 - 105)  BP: 126/76 (11-14-23 @ 13:38) (115/73 - 131/85)  RR: 17 (11-14-23 @ 12:20) (16 - 17)  SpO2: --  General: Female sitting comfortably btwn ctx. Visibly uncomfortable during ctx.   Head: Normocephalic. Atraumatic.   Eyes: No discharge, lids normal, conjunctiva normal  Lungs: No resp distress  Abdomen: Soft, nontender. Gravid.   TAUS:  Sono saved in ASOB.   NST: Reactive. Category 1.   SVE: No fluid seen. White physiologic discharge on gloves.   Neuro: No facial asymmetry, no slurred speech, moves all 4 extremities  Mood: Alert and lucid, appropriate mood and affect

## 2023-11-14 NOTE — OB RN TRIAGE NOTE - NS_OBGYNHISTORY_OBGYN_ALL_OB_FT
ectopic 2021 laparoscopy right  19 39 weeks  6#    Had syncopal episode 23: evaluated in ED: benign findings ectopic 1/2021 laparoscopy right  12/28/19 39 weeks vavd 6#7 f    Had syncopal episode 7/14/23: evaluated in ED: benign findings

## 2023-11-14 NOTE — OB PROVIDER DELIVERY SUMMARY - NSPROVIDERDELIVERYNOTE_OBGYN_ALL_OB_FT
Patient pushed over intact perineum. Viable female infant delivered, delayed cord clamping and baby placed on mother. Second degree laceration repaired with 2-0 chromic. Cervix/vagina and rectum intact

## 2023-11-14 NOTE — OB RN PATIENT PROFILE - FALL HARM RISK - UNIVERSAL INTERVENTIONS
Bed in lowest position, wheels locked, appropriate side rails in place/Call bell, personal items and telephone in reach/Instruct patient to call for assistance before getting out of bed or chair/Non-slip footwear when patient is out of bed/[place] to call system/Physically safe environment - no spills, clutter or unnecessary equipment/Purposeful Proactive Rounding/Room/bathroom lighting operational, light cord in reach

## 2023-11-14 NOTE — OB RN DELIVERY SUMMARY - BABY A: APGAR 1 MIN MUSCLE TONE, DELIVERY
NEPHROLOGY PROGRESS NOTE    PATIENT IDENTIFICATION:   Name:  Hugo Lundberg      MRN:  [id number]     61 y.o.  male             Reason for visit: ZULLY vs ZULLY/CKD    SUBJECTIVE:   Feels a little bit better, and was able to eat earlier today, but appetite remains poor, all in all, and did not eat any dinner tonight; breathing is comfortable; has had atrial fibrillation with RVR today; no SOB on RA; CBI with pink urine; abdomen remains slightly tender and he has had no BM or flatus today    OBJECTIVE:  Vitals:    06/18/19 1345 06/18/19 1454 06/18/19 1500 06/18/19 1906   BP: 139/77 126/79 126/79 143/87   BP Location: Right arm Right arm Right arm Right arm   Patient Position: Lying Lying Lying Lying   Pulse: (!) 137 (!) 148 (!) 135 102   Resp: 18 18   Temp: 98.1 °F (36.7 °C)   98.9 °F (37.2 °C)   TempSrc: Oral   Oral   SpO2: 96% 92% 91%    Weight:       Height:               Body mass index is 23.64 kg/m².    Intake/Output Summary (Last 24 hours) at 6/18/2019 1918  Last data filed at 6/18/2019 1857  Gross per 24 hour   Intake --   Output 88381 ml   Net -73853 ml     Wt Readings from Last 1 Encounters:   06/17/19 2018 79.1 kg (174 lb 4.8 oz)     Wt Readings from Last 3 Encounters:   06/17/19 79.1 kg (174 lb 4.8 oz)   01/17/14 78.9 kg (174 lb 0.2 oz)         Exam:  NAD; pleasant; oriented; looks younger than stated age; muscular  MMM; AT/NC   No eye discharge; no scleral icterus  No JVD; no carotid bruits  CTA bilat; not labored  Irregularly irregular, tachycardic, no rub  Rodas catheter anchored; CBI in progress  Soft, +T, +D, BS+  No edema  No clubbing  No asterixis  Moves all extremities   Mood anxious; odd affect  Speech is fluent    Scheduled meds:    piperacillin-tazobactam 3.375 g Intravenous Q8H   sodium chloride 3 mL Intravenous Q12H     IV meds:                        diltiaZEM 5-15 mg/hr Last Rate: 5 mg/hr (06/18/19 1628)   sodium chloride 3,000 mL    sodium chloride 125 mL/hr Last Rate: 125 mL/hr  (06/18/19 0627)       Data Review:    Results from last 7 days   Lab Units 06/18/19  1512 06/18/19  1158 06/18/19  0548  06/17/19  2231   SODIUM mmol/L 136 137 135*  135*   < > 133*   POTASSIUM mmol/L 4.3 4.5 4.7  4.7   < > 4.5   CHLORIDE mmol/L 101 101 98  98   < > 96*   CO2 mmol/L 19.9* 21.4* 22.5  22.5   < > 18.0*   BUN mg/dL 50* 56* 68*  68*   < > 84*   CREATININE mg/dL 2.95* 3.39* 4.45*  4.45*   < > 6.08*   CALCIUM mg/dL 9.2 9.1 9.1  9.1   < > 9.4   BILIRUBIN mg/dL  --   --  0.6  --  0.5   ALK PHOS U/L  --   --  59  --  57   ALT (SGPT) U/L  --   --  38  --  39   AST (SGOT) U/L  --   --  38  --  44*   GLUCOSE mg/dL 117* 88 92  92   < > 91    < > = values in this interval not displayed.     Estimated Creatinine Clearance: 29 mL/min (A) (by C-G formula based on SCr of 2.95 mg/dL (H)).  Results from last 7 days   Lab Units 06/18/19  0548   URIC ACID mg/dL 8.2*     Results from last 7 days   Lab Units 06/18/19  0548   MAGNESIUM mg/dL 2.8*   PHOSPHORUS mg/dL 3.8       Results from last 7 days   Lab Units 06/18/19  0548 06/17/19  2231   WBC 10*3/mm3 10.80 11.48*   HEMOGLOBIN g/dL 13.2 13.1   PLATELETS 10*3/mm3 344 341       Results from last 7 days   Lab Units 06/17/19  2231   INR  1.24*             ASSESSMENT:     Acute renal failure (ARF) (CMS/HCC)    Acute urinary retention    Large bowel obstruction (CMS/HCC)    Reducible left inguinal hernia    Hyponatremia    Dehydration    Hematuria    1.  ZULLY, non-oliguric, improving and due to obstructive uropathy: Volume status is stable. Hypovolemic hyponatremia, improving, had been compounded by very poor solute intake for the last week or two.  Stable potassium; + NAGMA  2.  Urinary retention  3.  Reducible left inguinal hernia  4.  Large bowel obstruction: No bowel wall thickening or free air in the abdomen, though abdomen remains distended and bowel sounds hypoactive  5.  Gross hematuria  6.  Elevated LFTs        PLAN:  1.  IV fluids still  2.  Surveillance  labs    Nestor Wong MD  6/18/2019    7:18 PM    (2) well flexed

## 2023-11-14 NOTE — DISCHARGE NOTE OB - PATIENT PORTAL LINK FT
You can access the FollowMyHealth Patient Portal offered by [facility] by registering at the following website: [email or web address]. By joining [facility]’s FollowMyHealth portal, you will also be able to view your health information using other applications (apps) compatible with our system.

## 2023-11-14 NOTE — OB RN PATIENT PROFILE - WEIGHT PRE-PREGNANCY IN KG
Order placed for pt per verbal order with read back from Dr. Mike Khoury 05/20/20    Lab slips mailed 68

## 2023-11-14 NOTE — OB PROVIDER H&P - ASSESSMENT
Ms. RODGER COLE is a 33y  @ 39w5d in active labor.     Plan  - Admit to Labor and Delivery for expectant management.   - Continuous EFM  - Clear diet, IV fluids  - Consent signed  - Standard labs (T&S, CBC, RPR)   - Epidural PRN, cleared by Dr. Rodriguez  - Consider re-examination in 4 hours     Informed consent was obtained. The following was discussed:  - Induction/augmentation of labor: use of medication and/or cook balloon to begin or enhance labor  - Obstetrical management including internal fetal/contraction monitoring  - Normal vaginal delivery  - Possible  section    Risks, benefits, alternatives, and possible complications have been discussed in detail with the patient in English, patient's preferred language, demonstrating understanding, all questions answered.. Pre-admission, admission, and post admission procedures and expectations were discussed in detail. All questions answered, all appropriate hospital consents were signed. Anticipate normal vaginal delivery.    Evaluation and plan d/w JUD Ghotra, d/w Dr. Rodriguez

## 2023-11-14 NOTE — OB PROVIDER TRIAGE NOTE - NSOBPROVIDERNOTE_OBGYN_ALL_OB_FT
Ms. RODGER COLE is a 33y  @ 39w5d in active labor.     Plan  - Admit to Labor and Delivery for expectant management.   - Continuous EFM  - Clear diet, IV fluids  - Consent signed  - Standard labs (T&S, CBC, RPR)   - Epidural PRN, cleared by DrMirtha   - Induction/augmentation agent:   - Consider re-examination in 4 hours     Informed consent was obtained. The following was discussed:  - Induction/augmentation of labor: use of medication and/or cook balloon to begin or enhance labor  - Obstetrical management including internal fetal/contraction monitoring  - Normal vaginal delivery  - Possible  section    Risks, benefits, alternatives, and possible complications have been discussed in detail with the patient in English, patient's preferred language, demonstrating understanding, all questions answered.. Pre-admission, admission, and post admission procedures and expectations were discussed in detail. All questions answered, all appropriate hospital consents were signed. Anticipate normal vaginal delivery.    Evaluation and plan d/w  Ms. RODGER COLE is a 33y  @ 39w5d in active labor.     Plan  - Admit to Labor and Delivery for expectant management.   - Continuous EFM  - Clear diet, IV fluids  - Consent signed  - Standard labs (T&S, CBC, RPR)   - Epidural PRN, cleared by Dr. Rodriguez  - Consider re-examination in 4 hours     Informed consent was obtained. The following was discussed:  - Induction/augmentation of labor: use of medication and/or cook balloon to begin or enhance labor  - Obstetrical management including internal fetal/contraction monitoring  - Normal vaginal delivery  - Possible  section    Risks, benefits, alternatives, and possible complications have been discussed in detail with the patient in English, patient's preferred language, demonstrating understanding, all questions answered.. Pre-admission, admission, and post admission procedures and expectations were discussed in detail. All questions answered, all appropriate hospital consents were signed. Anticipate normal vaginal delivery.    Evaluation and plan d/w JUD Ghotra, d/w Dr. Rodriguez

## 2023-11-14 NOTE — OB PROVIDER H&P - HISTORY OF PRESENT ILLNESS
Ms. RODGER COLE is a 33y  @ 39w5d p/w ctx q5-10 min since 5a, 8/10, wants epidural. Was seen in office this morning, 3cm dilated, told to come to hospital. + FM. Denies lof, vb. GBS neg (10/13/23). EFW 2800.   PNC: Dr. Larson. Denies prenatal issues or complications. Pt reports no hospitalizations, procedures, infections, or new diagnoses in pregnancy. Pt denies complications with blood pressure and/or blood sugar.

## 2023-11-14 NOTE — OB RN DELIVERY SUMMARY - NS_SEPSISRSKCALC_OBGYN_ALL_OB_FT
EOS calculated successfully. EOS Risk Factor: 0.5/1000 live births ([facility] national incidence); GA=39w5d; Temp=99; ROM=2.55; GBS='Negative'; Antibiotics='No antibiotics or any antibiotics < 2 hrs prior to birth'

## 2023-11-14 NOTE — OB RN PATIENT PROFILE - NS_OBGYNHISTORY_OBGYN_ALL_OB_FT
ectopic 1/2021 laparoscopy right  12/28/19 39 weeks vavd 6#7 f    Had syncopal episode in April and 7/14/23: evaluated in ED: benign findings

## 2023-11-14 NOTE — OB PROVIDER TRIAGE NOTE - HISTORY OF PRESENT ILLNESS
Ms. RODGER COLE is a 33y  @ 39w5d p/w ctx q5-10 min since 5a, 8/10, wants epidural. Was seen in office this morning, 3cm dilated, told to come to hospital. + FM. Denies lof, vb.   PNC: Dr. Larson. Denies prenatal issues or complications. Pt reports no hospitalizations, procedures, infections, or new diagnoses in pregnancy. Pt denies complications with blood pressure and/or blood sugar.

## 2023-11-14 NOTE — OB RN DELIVERY SUMMARY - NSSELHIDDEN_OBGYN_ALL_OB_FT
[NS_DeliveryAttending1_OBGYN_ALL_OB_FT:[id number]=],[NS_DeliveryRN_OBGYN_ALL_OB_FT:[id number]=]

## 2023-11-14 NOTE — DISCHARGE NOTE OB - HOSPITAL COURSE
Patient admitted in active labor and delivered viable female infant and had uncomplicated postpartum course

## 2023-11-14 NOTE — OB PROVIDER LABOR PROGRESS NOTE - NS_SUBJECTIVE/OBJECTIVE_OBGYN_ALL_OB_FT
Pt seen & examined at bedside for labor progress d/t possible SROM. Requesting epidural    Vital Signs Last 24 Hrs  T(C): 37.2 (14 Nov 2023 13:57), Max: 37.2 (14 Nov 2023 13:57)  T(F): 98.96 (14 Nov 2023 13:57), Max: 98.96 (14 Nov 2023 13:57)  HR: 100 (14 Nov 2023 14:27) (85 - 105)  BP: 106/67 (14 Nov 2023 14:17) (106/67 - 131/85)  BP(mean): --  RR: 18 (14 Nov 2023 13:57) (16 - 18)  SpO2: 99% (14 Nov 2023 14:27) (95% - 100%)       , moderate variability, +accels, -decels, cat 1   TOCO: Q2-5min  VE: 8/90/-2, BBOW
Pt seen & examined at bedside for labor progress. Resting comfortably with epidural.     Vital Signs Last 24 Hrs  T(C): 37.0 (14 Nov 2023 15:02), Max: 37.2 (14 Nov 2023 13:57)  T(F): 98.6 (14 Nov 2023 15:02), Max: 99 (14 Nov 2023 14:25)  HR: 90 (14 Nov 2023 15:07) (85 - 112)  BP: 139/80 (14 Nov 2023 15:07) (106/67 - 154/76)  BP(mean): --  RR: 16 (14 Nov 2023 15:02) (16 - 18)  SpO2: 100% (14 Nov 2023 15:07) (92% - 100%)    Parameters below as of 14 Nov 2023 14:25  Patient On (Oxygen Delivery Method): room air        FHR  140, moderate variability, +accels, -decels, cat 1   TOCO: Q5-6min  VE: 8/100/-1  AROM forebag clear
done

## 2023-11-14 NOTE — OB PROVIDER DELIVERY SUMMARY - NSSELHIDDEN_OBGYN_ALL_OB_FT
[NS_DeliveryAttending1_OBGYN_ALL_OB_FT:[id number]=],[NS_DeliveryRN_OBGYN_ALL_OB_FT:[id number]=]

## 2023-11-14 NOTE — OB PROVIDER LABOR PROGRESS NOTE - ASSESSMENT
34y/o  at 39+5wks in labor with cervical change & BBOW    Plan  -anesthesia now in room for epidural placement  -continue EFM/TOCO/IV fluids  -repositioning    Ludy Ghotra CNM
34y/o  at 39+5wks in labor AROM clear fluid    Plan  -anesthesia now in room for epidural placement  -continue EFM/TOCO/IV fluids  -repositioning  -anticipate     Ludy Ghotra CNM

## 2023-11-14 NOTE — OB PROVIDER H&P - NSLOWPPHRISK_OBGYN_A_OB
No previous uterine incision/[place] Pregnancy/Less than or equal to 4 previous vaginal births/No known bleeding disorder/No history of postpartum hemorrhage

## 2023-11-14 NOTE — DISCHARGE NOTE OB - CARE PROVIDER_API CALL
Yo Larson  Obstetrics and Gynecology  [address], [address]  [place] [address]  Phone: [phone]  Fax: [phone]  Follow Up Time:

## 2023-11-14 NOTE — DISCHARGE NOTE OB - MATERIALS PROVIDED
[facility] Screening Program/Shaken Baby Prevention Handout/Breastfeeding Guide and Packet/Birth Certificate Instructions [facility] Screening Program/Breastfeeding Log/Shaken Baby Prevention Handout/Breastfeeding Guide and Packet/Birth Certificate Instructions

## 2023-11-15 ENCOUNTER — TRANSCRIPTION ENCOUNTER (OUTPATIENT)
Age: 33
End: 2023-11-15

## 2023-11-15 VITALS
TEMPERATURE: 98 F | OXYGEN SATURATION: 100 % | SYSTOLIC BLOOD PRESSURE: 123 MMHG | RESPIRATION RATE: 17 BRPM | DIASTOLIC BLOOD PRESSURE: 73 MMHG | HEART RATE: 79 BPM

## 2023-11-15 LAB
T PALLIDUM AB TITR SER: NEGATIVE — SIGNIFICANT CHANGE UP
T PALLIDUM AB TITR SER: NEGATIVE — SIGNIFICANT CHANGE UP

## 2023-11-15 RX ADMIN — Medication 600 MILLIGRAM(S): at 01:38

## 2023-11-15 RX ADMIN — Medication 600 MILLIGRAM(S): at 12:25

## 2023-11-15 RX ADMIN — Medication 600 MILLIGRAM(S): at 06:36

## 2023-11-15 RX ADMIN — Medication 975 MILLIGRAM(S): at 03:30

## 2023-11-15 RX ADMIN — Medication 975 MILLIGRAM(S): at 15:36

## 2023-11-15 RX ADMIN — Medication 600 MILLIGRAM(S): at 18:05

## 2023-11-15 RX ADMIN — Medication 975 MILLIGRAM(S): at 04:30

## 2023-11-15 RX ADMIN — Medication 600 MILLIGRAM(S): at 00:38

## 2023-11-15 RX ADMIN — Medication 600 MILLIGRAM(S): at 11:44

## 2023-11-15 RX ADMIN — Medication 975 MILLIGRAM(S): at 16:15

## 2023-11-15 NOTE — PROGRESS NOTE ADULT - ASSESSMENT
34yo PPD#1 s/p .  Patient is stable and doing well post-partum.   - Pain well controlled, continue current pain regimen  - Increase ambulation, SCDs when not ambulating  - Continue regular diet  d/c home, instructions reviewed  f/u in office in 6 weeks       elis LARSON

## 2023-11-15 NOTE — PROGRESS NOTE ADULT - SUBJECTIVE AND OBJECTIVE BOX
post epidural d/c follow-up:    Pt states ambulating and freely voiding, Denies headache, nausea, vomiting.  states no concerns at this time.  VSS      ICU Vital Signs Last 24 Hrs  T(C): 36.6 (15 Nov 2023 06:34), Max: 37.2 (14 Nov 2023 13:57)  T(F): 97.9 (15 Nov 2023 06:34), Max: 99 (14 Nov 2023 14:25)  HR: 75 (15 Nov 2023 06:34) (72 - 117)  BP: 108/60 (15 Nov 2023 06:34) (104/63 - 161/70)  BP(mean): --  ABP: --  ABP(mean): --  RR: 18 (15 Nov 2023 06:34) (16 - 18)  SpO2: 100% (15 Nov 2023 06:34) (88% - 100%)    O2 Parameters below as of 15 Nov 2023 06:34  Patient On (Oxygen Delivery Method): room air        
OB Progress Note:  PPD#1    S: 32yo PPD#1 s/p . Patient feels well. Pain is well controlled. She is tolerating a regular diet. She is voiding spontaneously and ambulating without difficulty. Denies CP/SOB. Denies lightheadedness/dizziness. Denies N/V.    O:  Vitals:  Vital Signs Last 24 Hrs  T(C): 36.6 (15 Nov 2023 06:34), Max: 37.2 (2023 13:57)  T(F): 97.9 (15 Nov 2023 06:34), Max: 99 (2023 14:25)  HR: 75 (15 Nov 2023 06:34) (72 - 117)  BP: 108/60 (15 Nov 2023 06:34) (104/63 - 161/70)  BP(mean): --  RR: 18 (15 Nov 2023 06:34) (16 - 18)  SpO2: 100% (15 Nov 2023 06:34) (88% - 100%)    Parameters below as of 15 Nov 2023 06:34  Patient On (Oxygen Delivery Method): room air        MEDICATIONS  (STANDING):  acetaminophen     Tablet .. 975 milliGRAM(s) Oral <User Schedule>  diphtheria/tetanus/pertussis (acellular) Vaccine (Adacel) 0.5 milliLiter(s) IntraMuscular once  ibuprofen  Tablet. 600 milliGRAM(s) Oral every 6 hours  oxytocin Infusion 333.333 milliUNIT(s)/Min (1000 mL/Hr) IV Continuous <Continuous>  prenatal multivitamin 1 Tablet(s) Oral daily  sodium chloride 0.9% lock flush 3 milliLiter(s) IV Push every 8 hours      Labs:  Blood type: O Positive  Rubella IgG: RPR: Negative                          14.1   17.53<H> >-----------< 253    (  @ 17:40 )             42.4                        14.8   12.28<H> >-----------< 320    (  @ 13:45 )             44.5    23 @ 17:40      137  |  104  |  11  ----------------------------<  96  4.5   |  23  |  0.62        Ca    9.3      2023 17:40    TPro  6.2  /  Alb  3.3  /  TBili  <0.2  /  DBili  x   /  AST  20  /  ALT  9   /  AlkPhos  135<H>  23 @ 17:40          Physical Exam:  General: NAD  Abdomen: soft, non-tender, non-distended, fundus firm  Extremities: No erythema/edema

## 2023-11-24 ENCOUNTER — TRANSCRIPTION ENCOUNTER (OUTPATIENT)
Age: 33
End: 2023-11-24

## 2023-11-28 ENCOUNTER — TRANSCRIPTION ENCOUNTER (OUTPATIENT)
Age: 33
End: 2023-11-28

## 2024-01-02 ENCOUNTER — APPOINTMENT (OUTPATIENT)
Dept: OBGYN | Facility: CLINIC | Age: 34
End: 2024-01-02
Payer: COMMERCIAL

## 2024-01-02 VITALS
WEIGHT: 171 LBS | DIASTOLIC BLOOD PRESSURE: 85 MMHG | SYSTOLIC BLOOD PRESSURE: 119 MMHG | BODY MASS INDEX: 30.3 KG/M2 | HEART RATE: 96 BPM | HEIGHT: 63 IN

## 2024-01-02 DIAGNOSIS — Z34.91 ENCOUNTER FOR SUPERVISION OF NORMAL PREGNANCY, UNSPECIFIED, FIRST TRIMESTER: ICD-10-CM

## 2024-01-02 DIAGNOSIS — Z34.92 ENCOUNTER FOR SUPERVISION OF NORMAL PREGNANCY, UNSPECIFIED, SECOND TRIMESTER: ICD-10-CM

## 2024-01-02 DIAGNOSIS — Z32.01 ENCOUNTER FOR PREGNANCY TEST, RESULT POSITIVE: ICD-10-CM

## 2024-01-02 DIAGNOSIS — Z34.93 ENCOUNTER FOR SUPERVISION OF NORMAL PREGNANCY, UNSPECIFIED, THIRD TRIMESTER: ICD-10-CM

## 2024-01-02 PROCEDURE — 0503F POSTPARTUM CARE VISIT: CPT

## 2024-01-02 RX ORDER — TERCONAZOLE 4 MG/G
0.4 CREAM VAGINAL DAILY
Qty: 1 | Refills: 0 | Status: DISCONTINUED | COMMUNITY
Start: 2023-10-13 | End: 2024-01-02

## 2024-01-02 NOTE — HISTORY OF PRESENT ILLNESS
[Postpartum Follow Up] : postpartum follow up [Delivery Date: ___] : on [unfilled] [Female] : Delivery History: baby girl [Vaginal Delivery] : vaginal delivery [Pertussis Vaccine] : Pertussis vaccine administered [Girl] : baby is a girl [Infant's Name ___] : [unfilled] [___ Lbs] : [unfilled] lbs [___ Oz] : [unfilled] oz [Living at Home] : is currently living at home [Breastfeeding] : currently nursing [Intended Contraception] : Intended Contraception: [Complications:___] : no complications [] : delivered by vaginal delivery [Rhogam] : Rhogam was not administered [Rubella Vaccine] : Rubella vaccine was not administered [BTL] : no tubal ligation [Resumed Menses] : has not resumed her menses [Resumed [place]] : has not resumed intercourse [Awake] : awake [Alert] : alert [Acute Distress] : no acute distress [Mass] : no breast mass [Nipple Discharge] : nipple discharge [Axillary LAD] : no axillary lymphadenopathy [Soft] : soft [Tender] : non tender [Distended] : not distended [Oriented x3] : oriented to person, place, and time [Depressed Mood] : not depressed [Flat Affect] : affect not flat [Normal] : external genitalia [Motion Tenderness] : there was no cervical motion tenderness [Tenderness] : nontender [Adnexa Tenderness] : were not tender [Doing Well] : is doing well [No Sign of Infection] : is showing no signs of infection [Excellent Pain Control] : has excellent pain control [None] : None [FreeTextEntry8] : This 34 yo  presents post vaginal delivery for PPV; overall feels well, voiding and stooling without issue, desires OCP. [de-identified] : Nl PPV [de-identified] : Will Rx Kamilla- consistent PO intake/condoms stressed; [place] exercise sheet as well as [facility] class info in hand; RTO prn or for annual in 4 months

## 2024-01-26 ENCOUNTER — TRANSCRIPTION ENCOUNTER (OUTPATIENT)
Age: 34
End: 2024-01-26

## 2024-01-29 ENCOUNTER — TRANSCRIPTION ENCOUNTER (OUTPATIENT)
Age: 34
End: 2024-01-29

## 2024-02-02 RX ORDER — NORETHINDRONE 0.35 MG/1
0.35 TABLET ORAL DAILY
Qty: 84 | Refills: 1 | Status: ACTIVE | COMMUNITY
Start: 2024-01-02 | End: 1900-01-01

## 2024-06-22 NOTE — OB RN PATIENT PROFILE - ANESTHESIA, PREVIOUS REACTION, PROFILE
Please follow up with a hand clinic provider for further management of your right thumb.  Referral and contact information has been included in your discharge paperwork.  Return to the emergency department if you development numbness in your right hand or are unable to move your right fingers with the splint.  You are not supposed to be able to move your thumb.    
none

## 2024-07-22 ENCOUNTER — RX RENEWAL (OUTPATIENT)
Age: 34
End: 2024-07-22

## 2024-07-22 DIAGNOSIS — Z30.40 ENCOUNTER FOR SURVEILLANCE OF CONTRACEPTIVES, UNSPECIFIED: ICD-10-CM

## 2024-07-22 RX ORDER — NORETHINDRONE 0.35 MG/1
0.35 TABLET ORAL
Qty: 84 | Refills: 0 | Status: ACTIVE | COMMUNITY
Start: 2024-07-22 | End: 1900-01-01

## 2024-07-26 NOTE — OB RN TRIAGE NOTE - NSSDOHPHYHURT_OBGYN_A_OB
HCC coding opportunities       Chart reviewed, no opportunity found: CHART REVIEWED, NO OPPORTUNITY FOUND        Patients Insurance     Medicare Insurance: [employer]vantage          
never

## 2024-10-08 ENCOUNTER — APPOINTMENT (OUTPATIENT)
Dept: OBGYN | Facility: CLINIC | Age: 34
End: 2024-10-08

## 2024-10-14 ENCOUNTER — RX RENEWAL (OUTPATIENT)
Age: 34
End: 2024-10-14

## 2024-12-11 ENCOUNTER — APPOINTMENT (OUTPATIENT)
Dept: OBGYN | Facility: CLINIC | Age: 34
End: 2024-12-11
Payer: COMMERCIAL

## 2024-12-11 VITALS
WEIGHT: 144 LBS | SYSTOLIC BLOOD PRESSURE: 123 MMHG | DIASTOLIC BLOOD PRESSURE: 82 MMHG | BODY MASS INDEX: 25.52 KG/M2 | HEIGHT: 63 IN | HEART RATE: 90 BPM

## 2024-12-11 DIAGNOSIS — N75.9: ICD-10-CM

## 2024-12-11 PROCEDURE — 99213 OFFICE O/P EST LOW 20 MIN: CPT

## 2024-12-11 PROCEDURE — 99459 PELVIC EXAMINATION: CPT

## 2024-12-11 RX ORDER — CEPHALEXIN 500 MG/1
500 TABLET ORAL TWICE DAILY
Qty: 14 | Refills: 0 | Status: ACTIVE | COMMUNITY
Start: 2024-12-11 | End: 1900-01-01

## 2024-12-11 RX ORDER — FLUCONAZOLE 150 MG/1
150 TABLET ORAL
Qty: 2 | Refills: 0 | Status: ACTIVE | COMMUNITY
Start: 2024-12-11 | End: 1900-01-01

## 2024-12-27 ENCOUNTER — APPOINTMENT (OUTPATIENT)
Dept: OBGYN | Facility: CLINIC | Age: 34
End: 2024-12-27
Payer: COMMERCIAL

## 2024-12-27 VITALS
DIASTOLIC BLOOD PRESSURE: 71 MMHG | WEIGHT: 146 LBS | HEIGHT: 63 IN | HEART RATE: 80 BPM | SYSTOLIC BLOOD PRESSURE: 119 MMHG | BODY MASS INDEX: 25.87 KG/M2

## 2024-12-27 DIAGNOSIS — Z01.419 ENCOUNTER FOR GYNECOLOGICAL EXAMINATION (GENERAL) (ROUTINE) W/OUT ABNORMAL FINDINGS: ICD-10-CM

## 2024-12-27 PROCEDURE — 99459 PELVIC EXAMINATION: CPT

## 2024-12-27 PROCEDURE — 99395 PREV VISIT EST AGE 18-39: CPT

## 2024-12-27 PROCEDURE — G0444 DEPRESSION SCREEN ANNUAL: CPT | Mod: 59

## 2024-12-27 RX ORDER — DROSPIRENONE AND ETHINYL ESTRADIOL 0.02-3(28)
3-0.02 KIT ORAL
Qty: 84 | Refills: 3 | Status: ACTIVE | COMMUNITY
Start: 2024-12-27 | End: 1900-01-01

## 2024-12-31 LAB — HPV HIGH+LOW RISK DNA PNL CVX: NOT DETECTED

## 2025-01-02 LAB — CYTOLOGY CVX/VAG DOC THIN PREP: NORMAL

## 2025-04-07 NOTE — H&P ADULT - NSHPPHYSICALEXAM_GEN_ALL_CORE
Attempted to contact patient in regards to scheduling an open access colonoscopy that was ordered by her PCP. Patient's mailbox was full so I was unable to leave a message to call back and schedule this.   Vital Signs Last 24 Hrs  T(C): 36.9 (15 Dennis 2021 16:42), Max: 37.1 (15 Dennis 2021 12:20)  T(F): 98.4 (15 Dennis 2021 16:42), Max: 98.8 (15 Dennis 2021 12:20)  HR: 84 (15 Dennis 2021 16:42) (84 - 100)  BP: 119/68 (15 Dennis 2021 16:42) (119/68 - 139/84)  BP(mean): --  RR: 18 (15 Dennis 2021 16:42) (18 - 18)  SpO2: 100% (15 Dennis 2021 16:42) (100% - 100%)    PHYSICAL EXAM:   Gen: NAD, alert and oriented x 3  Cardiovascular: regular   Respiratory: breathing comfortably on RA  Abd: soft, non tender, non-distended  Pelvic: nontender b/l adnexa, cervix closed and long  Extremities: NTBL  Skin: warm and well perfused

## 2025-06-30 NOTE — OB RN PATIENT PROFILE - HBSAG: DATE, OB PROFILE
----- Message from Vicki Avila MD sent at 6/30/2025  1:07 PM CDT -----  I do not see any infection , talk to pulmonary , considered stable.   Will proceed as planned.  
Patient notified. Also sent a message through the [facility].   
28-Mar-2023